# Patient Record
Sex: FEMALE | Race: WHITE | NOT HISPANIC OR LATINO | Employment: FULL TIME | URBAN - METROPOLITAN AREA
[De-identification: names, ages, dates, MRNs, and addresses within clinical notes are randomized per-mention and may not be internally consistent; named-entity substitution may affect disease eponyms.]

---

## 2017-03-24 ENCOUNTER — OFFICE VISIT - RIVER FALLS (OUTPATIENT)
Dept: FAMILY MEDICINE | Facility: CLINIC | Age: 47
End: 2017-03-24

## 2017-03-24 ASSESSMENT — MIFFLIN-ST. JEOR: SCORE: 1105.77

## 2017-10-24 ENCOUNTER — OFFICE VISIT - RIVER FALLS (OUTPATIENT)
Dept: FAMILY MEDICINE | Facility: CLINIC | Age: 47
End: 2017-10-24

## 2017-10-24 ASSESSMENT — MIFFLIN-ST. JEOR: SCORE: 1101.23

## 2017-10-25 LAB
CHOLEST SERPL-MCNC: 202 MG/DL
CHOLEST/HDLC SERPL: 2.9 {RATIO}
CREAT SERPL-MCNC: 0.76 MG/DL (ref 0.5–1.1)
GLUCOSE BLD-MCNC: 80 MG/DL (ref 65–99)
HDLC SERPL-MCNC: 69 MG/DL
LDLC SERPL CALC-MCNC: 111 MG/DL
NONHDLC SERPL-MCNC: 133 MG/DL
TRIGL SERPL-MCNC: 114 MG/DL

## 2017-11-22 ENCOUNTER — OFFICE VISIT - RIVER FALLS (OUTPATIENT)
Dept: FAMILY MEDICINE | Facility: CLINIC | Age: 47
End: 2017-11-22

## 2017-11-22 ENCOUNTER — COMMUNICATION - RIVER FALLS (OUTPATIENT)
Dept: FAMILY MEDICINE | Facility: CLINIC | Age: 47
End: 2017-11-22

## 2017-11-22 ASSESSMENT — MIFFLIN-ST. JEOR: SCORE: 1098.51

## 2017-11-29 ENCOUNTER — TRANSFERRED RECORDS (OUTPATIENT)
Dept: HEALTH INFORMATION MANAGEMENT | Facility: CLINIC | Age: 47
End: 2017-11-29

## 2017-11-29 LAB — HPV ABSTRACT: NORMAL

## 2018-09-12 ENCOUNTER — OFFICE VISIT - RIVER FALLS (OUTPATIENT)
Dept: FAMILY MEDICINE | Facility: CLINIC | Age: 48
End: 2018-09-12

## 2018-11-07 ENCOUNTER — OFFICE VISIT - RIVER FALLS (OUTPATIENT)
Dept: FAMILY MEDICINE | Facility: CLINIC | Age: 48
End: 2018-11-07

## 2018-12-07 ENCOUNTER — OFFICE VISIT - RIVER FALLS (OUTPATIENT)
Dept: FAMILY MEDICINE | Facility: CLINIC | Age: 48
End: 2018-12-07

## 2018-12-08 LAB
CHOLEST SERPL-MCNC: 172 MG/DL
CHOLEST/HDLC SERPL: 2.4 {RATIO}
CREAT SERPL-MCNC: 0.76 MG/DL (ref 0.5–1.1)
GLUCOSE BLD-MCNC: 84 MG/DL (ref 65–99)
HDLC SERPL-MCNC: 71 MG/DL
LDLC SERPL CALC-MCNC: 84 MG/DL
NONHDLC SERPL-MCNC: 101 MG/DL
TRIGL SERPL-MCNC: 84 MG/DL

## 2019-06-26 ENCOUNTER — OFFICE VISIT - RIVER FALLS (OUTPATIENT)
Dept: FAMILY MEDICINE | Facility: CLINIC | Age: 49
End: 2019-06-26

## 2019-08-19 ENCOUNTER — OFFICE VISIT - RIVER FALLS (OUTPATIENT)
Dept: FAMILY MEDICINE | Facility: CLINIC | Age: 49
End: 2019-08-19

## 2019-08-19 ASSESSMENT — MIFFLIN-ST. JEOR: SCORE: 1132.98

## 2019-09-03 ENCOUNTER — OFFICE VISIT - RIVER FALLS (OUTPATIENT)
Dept: FAMILY MEDICINE | Facility: CLINIC | Age: 49
End: 2019-09-03

## 2019-09-03 ASSESSMENT — MIFFLIN-ST. JEOR: SCORE: 1121.19

## 2019-09-24 ENCOUNTER — OFFICE VISIT - RIVER FALLS (OUTPATIENT)
Dept: FAMILY MEDICINE | Facility: CLINIC | Age: 49
End: 2019-09-24

## 2019-09-24 ASSESSMENT — MIFFLIN-ST. JEOR: SCORE: 1119.38

## 2020-01-09 ENCOUNTER — AMBULATORY - RIVER FALLS (OUTPATIENT)
Dept: FAMILY MEDICINE | Facility: CLINIC | Age: 50
End: 2020-01-09

## 2020-01-10 ENCOUNTER — COMMUNICATION - RIVER FALLS (OUTPATIENT)
Dept: FAMILY MEDICINE | Facility: CLINIC | Age: 50
End: 2020-01-10

## 2020-01-10 LAB
BUN SERPL-MCNC: 15 MG/DL (ref 7–25)
BUN/CREAT RATIO - HISTORICAL: NORMAL (ref 6–22)
CALCIUM SERPL-MCNC: 9.2 MG/DL (ref 8.6–10.2)
CHLORIDE BLD-SCNC: 106 MMOL/L (ref 98–110)
CHOLEST SERPL-MCNC: 201 MG/DL
CHOLEST/HDLC SERPL: 3.1 {RATIO}
CO2 SERPL-SCNC: 27 MMOL/L (ref 20–32)
CREAT SERPL-MCNC: 0.74 MG/DL (ref 0.5–1.1)
EGFRCR SERPLBLD CKD-EPI 2021: 95 ML/MIN/1.73M2
GLUCOSE BLD-MCNC: 94 MG/DL (ref 65–99)
HDLC SERPL-MCNC: 64 MG/DL
LDLC SERPL CALC-MCNC: 122 MG/DL
NONHDLC SERPL-MCNC: 137 MG/DL
POTASSIUM BLD-SCNC: 4.4 MMOL/L (ref 3.5–5.3)
SODIUM SERPL-SCNC: 139 MMOL/L (ref 135–146)
TRIGL SERPL-MCNC: 60 MG/DL

## 2020-01-14 ENCOUNTER — OFFICE VISIT - RIVER FALLS (OUTPATIENT)
Dept: FAMILY MEDICINE | Facility: CLINIC | Age: 50
End: 2020-01-14

## 2020-02-12 ENCOUNTER — OFFICE VISIT - RIVER FALLS (OUTPATIENT)
Dept: FAMILY MEDICINE | Facility: CLINIC | Age: 50
End: 2020-02-12

## 2020-02-12 ASSESSMENT — MIFFLIN-ST. JEOR: SCORE: 1126.63

## 2020-07-30 ENCOUNTER — OFFICE VISIT - RIVER FALLS (OUTPATIENT)
Dept: FAMILY MEDICINE | Facility: CLINIC | Age: 50
End: 2020-07-30

## 2020-07-30 ASSESSMENT — MIFFLIN-ST. JEOR: SCORE: 1108.49

## 2020-11-27 ENCOUNTER — OFFICE VISIT - RIVER FALLS (OUTPATIENT)
Dept: FAMILY MEDICINE | Facility: CLINIC | Age: 50
End: 2020-11-27

## 2020-11-27 ASSESSMENT — MIFFLIN-ST. JEOR: SCORE: 1110.3

## 2021-02-08 ENCOUNTER — AMBULATORY - RIVER FALLS (OUTPATIENT)
Dept: FAMILY MEDICINE | Facility: CLINIC | Age: 51
End: 2021-02-08

## 2021-02-08 ENCOUNTER — OFFICE VISIT - RIVER FALLS (OUTPATIENT)
Dept: FAMILY MEDICINE | Facility: CLINIC | Age: 51
End: 2021-02-08

## 2021-02-10 LAB — SARS-COV-2 RNA RESP QL NAA+PROBE: NEGATIVE

## 2021-02-16 ENCOUNTER — OFFICE VISIT - RIVER FALLS (OUTPATIENT)
Dept: FAMILY MEDICINE | Facility: CLINIC | Age: 51
End: 2021-02-16

## 2021-02-16 ASSESSMENT — MIFFLIN-ST. JEOR: SCORE: 1114.84

## 2021-09-21 ENCOUNTER — OFFICE VISIT - RIVER FALLS (OUTPATIENT)
Dept: FAMILY MEDICINE | Facility: CLINIC | Age: 51
End: 2021-09-21

## 2021-12-23 ENCOUNTER — OFFICE VISIT - RIVER FALLS (OUTPATIENT)
Dept: FAMILY MEDICINE | Facility: CLINIC | Age: 51
End: 2021-12-23

## 2022-02-04 ENCOUNTER — TRANSFERRED RECORDS (OUTPATIENT)
Dept: HEALTH INFORMATION MANAGEMENT | Facility: CLINIC | Age: 52
End: 2022-02-04

## 2022-02-11 VITALS
WEIGHT: 120.4 LBS | BODY MASS INDEX: 22.84 KG/M2 | WEIGHT: 121 LBS | HEART RATE: 72 BPM | HEIGHT: 61 IN | SYSTOLIC BLOOD PRESSURE: 136 MMHG | RESPIRATION RATE: 16 BRPM | TEMPERATURE: 99 F | TEMPERATURE: 99 F | DIASTOLIC BLOOD PRESSURE: 84 MMHG | SYSTOLIC BLOOD PRESSURE: 127 MMHG | DIASTOLIC BLOOD PRESSURE: 85 MMHG | HEIGHT: 61 IN | HEART RATE: 99 BPM | BODY MASS INDEX: 22.73 KG/M2

## 2022-02-11 VITALS
WEIGHT: 125 LBS | OXYGEN SATURATION: 98 % | HEIGHT: 61 IN | HEART RATE: 88 BPM | WEIGHT: 125.4 LBS | HEART RATE: 84 BPM | DIASTOLIC BLOOD PRESSURE: 60 MMHG | BODY MASS INDEX: 23.68 KG/M2 | HEIGHT: 61 IN | BODY MASS INDEX: 23.6 KG/M2 | TEMPERATURE: 98.7 F | SYSTOLIC BLOOD PRESSURE: 110 MMHG | SYSTOLIC BLOOD PRESSURE: 116 MMHG | DIASTOLIC BLOOD PRESSURE: 72 MMHG

## 2022-02-11 VITALS
HEART RATE: 80 BPM | SYSTOLIC BLOOD PRESSURE: 110 MMHG | WEIGHT: 120.6 LBS | DIASTOLIC BLOOD PRESSURE: 76 MMHG | TEMPERATURE: 98.5 F | BODY MASS INDEX: 22.79 KG/M2

## 2022-02-11 VITALS
SYSTOLIC BLOOD PRESSURE: 121 MMHG | DIASTOLIC BLOOD PRESSURE: 85 MMHG | TEMPERATURE: 98 F | BODY MASS INDEX: 22.86 KG/M2 | HEART RATE: 102 BPM | WEIGHT: 121 LBS

## 2022-02-11 VITALS
BODY MASS INDEX: 23.41 KG/M2 | HEIGHT: 61 IN | SYSTOLIC BLOOD PRESSURE: 108 MMHG | DIASTOLIC BLOOD PRESSURE: 70 MMHG | HEART RATE: 76 BPM | WEIGHT: 124 LBS | TEMPERATURE: 98 F

## 2022-02-11 VITALS
HEART RATE: 76 BPM | DIASTOLIC BLOOD PRESSURE: 60 MMHG | SYSTOLIC BLOOD PRESSURE: 94 MMHG | HEIGHT: 61 IN | OXYGEN SATURATION: 100 % | TEMPERATURE: 98.7 F | BODY MASS INDEX: 23.9 KG/M2 | WEIGHT: 126.6 LBS

## 2022-02-11 VITALS
HEIGHT: 61 IN | RESPIRATION RATE: 16 BRPM | TEMPERATURE: 98 F | WEIGHT: 122 LBS | BODY MASS INDEX: 23.03 KG/M2 | HEART RATE: 72 BPM | DIASTOLIC BLOOD PRESSURE: 68 MMHG | SYSTOLIC BLOOD PRESSURE: 110 MMHG

## 2022-02-11 VITALS
DIASTOLIC BLOOD PRESSURE: 78 MMHG | SYSTOLIC BLOOD PRESSURE: 120 MMHG | WEIGHT: 124 LBS | BODY MASS INDEX: 24.17 KG/M2 | DIASTOLIC BLOOD PRESSURE: 74 MMHG | WEIGHT: 128 LBS | HEART RATE: 96 BPM | SYSTOLIC BLOOD PRESSURE: 120 MMHG | TEMPERATURE: 98.2 F | HEART RATE: 101 BPM | BODY MASS INDEX: 23.43 KG/M2 | TEMPERATURE: 98.5 F | HEIGHT: 61 IN

## 2022-02-11 VITALS
TEMPERATURE: 98.5 F | WEIGHT: 123 LBS | HEART RATE: 87 BPM | HEIGHT: 61 IN | BODY MASS INDEX: 23.22 KG/M2 | DIASTOLIC BLOOD PRESSURE: 82 MMHG | HEIGHT: 61 IN | OXYGEN SATURATION: 98 % | RESPIRATION RATE: 18 BRPM | SYSTOLIC BLOOD PRESSURE: 126 MMHG | BODY MASS INDEX: 23.24 KG/M2

## 2022-02-11 VITALS
HEART RATE: 99 BPM | BODY MASS INDEX: 24.94 KG/M2 | WEIGHT: 132 LBS | DIASTOLIC BLOOD PRESSURE: 85 MMHG | SYSTOLIC BLOOD PRESSURE: 122 MMHG

## 2022-02-11 VITALS
DIASTOLIC BLOOD PRESSURE: 68 MMHG | BODY MASS INDEX: 23.15 KG/M2 | WEIGHT: 122.6 LBS | SYSTOLIC BLOOD PRESSURE: 110 MMHG | TEMPERATURE: 97.9 F | OXYGEN SATURATION: 99 % | HEART RATE: 82 BPM | HEIGHT: 61 IN

## 2022-02-11 VITALS
SYSTOLIC BLOOD PRESSURE: 114 MMHG | DIASTOLIC BLOOD PRESSURE: 78 MMHG | WEIGHT: 131 LBS | TEMPERATURE: 98.7 F | HEART RATE: 84 BPM | BODY MASS INDEX: 24.75 KG/M2

## 2022-02-16 NOTE — TELEPHONE ENCOUNTER
---------------------  From: Angelita Calle CMA (Phone Messages Pool ([id number]_[facility]))   To: [facility] Message Pool ([address]_[place] [facility] [place]);     Sent: 1/8/2019 8:44:12 AM CST  Subject: Phone Note: refill     Phone Message    PCP:   TFS      Time of Call:  8:32 am       Person Calling:  pt  Phone number:  [phone]    Returned call at: 8:42 am - LM notifying pt that TFS out of clinic until tomorrow.    Note:   Pt calls requesting a refill of diflucan (last filled 11/22/17 #3, 6 refills). Please advise.     RTC: June    Pharmacy: [facility]    Last office visit and reason:  12/7/18; well adult exam---------------------  From: Cesia Hardin ([facility] Message Pool ([id number]))   To: Jj Grey MD;     Sent: 1/8/2019 3:52:11 PM CST  Subject: FW: Phone Note: refill---------------------  From: Jj Grey MD   To: [facility] Message Pool ([facility]_[place] [facility] [place]);     Sent: 1/8/2019 5:23:04 PM CST  Subject: RE: Phone Note: refill     ok to refill same # and directionsRx sent.

## 2022-02-16 NOTE — NURSING NOTE
Comprehensive Intake Entered On:  9/24/2019 3:27 PM CDT    Performed On:  9/24/2019 3:24 PM CDT by Geni Kaur CMA               Summary   Chief Complaint :   Work comp f/u on right elbow pain   Weight Measured :   125 lb(Converted to: 125 lb 0 oz, 56.70 kg)    Height Measured :   61 in(Converted to: 5 ft 1 in, 154.94 cm)    Body Mass Index :   23.62 kg/m2   Body Surface Area :   1.56 m2   Height/Length Estimated :   61    Systolic Blood Pressure :   110 mmHg   Diastolic Blood Pressure :   72 mmHg   Mean Arterial Pressure :   85 mmHg   Peripheral Pulse Rate :   84 bpm   BP Site :   Left arm   BP Method :   Manual   Oxygen Saturation :   98 %   Geni Kaur CMA - 9/24/2019 3:24 PM CDT   Health Status   Allergies Verified? :   Yes   Medication History Verified? :   Yes   Medical History Verified? :   Yes   Pre-Visit Planning Status :   Completed   Tobacco Use? :   Current every day smoker   Geni Kaur CMA - 9/24/2019 3:24 PM CDT   Consents   Consent for Immunization Exchange :   Consent Granted   Consent for Immunizations to Providers :   Consent Granted   Geni Kaur CMA - 9/24/2019 3:24 PM CDT   Meds / Allergies   (As Of: 9/24/2019 3:27:50 PM CDT)   Allergies (Active)   lisinopril  Estimated Onset Date:   Unspecified ; Reactions:   Cough ; Created By:   Cesia Hardin; Reaction Status:   Active ; Category:   Drug ; Substance:   lisinopril ; Type:   Allergy ; Updated By:   Cesia Hardin; Reviewed Date:   9/24/2019 3:25 PM CDT        Medication List   (As Of: 9/24/2019 3:27:50 PM CDT)   Prescription/Discharge Order    hydrochlorothiazide-losartan  :   hydrochlorothiazide-losartan ; Status:   Prescribed ; Ordered As Mnemonic:   hydrochlorothiazide-losartan 12.5 mg-50 mg oral tablet ; Simple Display Line:   1 tab(s), PO, Daily, 90 tab(s), 1 Refill(s) ; Ordering Provider:   Jj Grey MD; Catalog Code:   hydrochlorothiazide-losartan ; Order Dt/Tm:   6/26/2019 4:33:49 PM            Home Meds     triamcinolone  :   triamcinolone ; Status:   Documented ; Ordered As Mnemonic:   kenalog injection ; Simple Display Line:   Once monthly into scalp ; Catalog Code:   triamcinolone ; Order Dt/Tm:   1/26/2011 4:27:33 PM

## 2022-02-16 NOTE — TELEPHONE ENCOUNTER
---------------------  From: Rupa INIGUEZ, Linda WOOD   To: Appointment Pool ([id number]_[place]);     Sent: 2/8/2021 11:35:06 AM CST  Subject: covid test     please call pt to schedule covid 19 test.    She needs to be back home by 3:00 for a meeting so feel free to double book her early if needed.  Thankspatient is scheduled for 02/08

## 2022-02-16 NOTE — LETTER
(Inserted Image. Unable to display)   December 27, 2019        BUFFY PONCENS   [address] [place], [place] [id number]        Dear BUFFY,      Thank you for selecting [facility] for your healthcare needs.    Our records indicate you are due for the following services:    Hypertension check ~ please remember to bring your at-home blood pressure readings with you to your appointment.   Fasting Lab Tests ~ Please do not eat or drink anything 10 hours prior to your scheduled appointment time.  (Water and any medications that you may need are allowed unless directed otherwise.)    If you had your labs done at another facility or with Direct Access Lab Testing at [facility], please bring in a copy of the results to your next visit, mail a copy, or drop off a copy of your results to your Healthcare Provider.    You are due for lab work and an office visit, please schedule the lab appointment 1 week before the office visit.  This will assure all results are available to discuss with your provider during your visit.    **It is very helpful if you bring your medication bottles to your appointment.  This assures we have all of your current medications, including strength and dosing information, documented accurately in your medical record.    To schedule an appointment or if you have further questions, please contact your primary clinic:   [facility]       [phone]   [facility]       [phone]              [facility]     [phone]      Powered by [employer]    Sincerely,    Jj Grey M.D.

## 2022-02-16 NOTE — PROGRESS NOTES
Patient:   BUFFY JOHNSON            MRN: [id number]            FIN: [id number]               Age:   49 years     Sex:  Female     :  1970   Associated Diagnoses:   HTN (hypertension)   Author:   Jj Grey MD      Visit Information      Date of Service: 2020 09:20 am  Performing Location: [facility]  Encounter#: [id number]      Primary Care Provider (PCP):  Jj Grey MD    NPI# [id number]      Referring Provider:  Jj Grey MD# [id number]      Chief Complaint   2020 9:23 AM CDT    f/u on BP        History of Present Illness             The patient presents for follow-up evaluation of hypertension, chief complaint and symptoms as noted above confirmed with patient .  The context of the hypertension: blood pressure was maintained within the target range.  Exacerbating factors consist of none.  Relieving factors consist of medication.  Associated symptoms consist of none.  Additional pertinent history: none.        Review of Systems   Constitutional:  Negative.    Eye:  Negative.    Ear/Nose/Mouth/Throat:  Negative.    Respiratory:  Negative.    Cardiovascular:  Negative.    Gastrointestinal:  Negative.    Genitourinary:  Negative.    Hematology/Lymphatics:  Negative.    Endocrine:  Negative.    Immunologic:  Negative.    Musculoskeletal:  Negative.    Integumentary:  Negative.    Neurologic:  Negative.       Health Status   Allergies:    Allergic Reactions (Selected)  Severity Not Documented  Lisinopril (Cough)   Medications:  (Selected)   Prescriptions  Prescribed  fluconazole 150 mg oral tablet: See Instructions, Instructions: TAKE 1 TABLET BY MOUTH 1 TIME, # 3 tab(s), 0 Refill(s), Type: Soft Stop, Pharmacy: [id number], TAKE 1 TABLET BY MOUTH 1 TIME, 61, in, 20 15:24:00 CST, Height Measured, 126.6, lb, 20 15:24:00...  hydrochlorothiazide-losartan 12.5 mg-50 mg oral tablet: 1 tab(s), PO, Daily, # 90 tab(s), 1 Refill(s),  Type: Maintenance, Pharmacy: [facility] #[id number], 1 tab(s) Oral daily  Documented Medications  Documented  kenalog injection: Instructions: Once monthly into scalp, 0 Refill(s), Type: Maintenance,    Medications          *denotes recorded medication          fluconazole 150 mg oral tablet: See Instructions, TAKE 1 TABLET BY MOUTH 1 TIME, 3 tab(s), 0 Refill(s).          hydrochlorothiazide-losartan 12.5 mg-50 mg oral tablet: 1 tab(s), PO, Daily, 90 tab(s), 1 Refill(s).          *kenalog injection: Once monthly into scalp.       Problem list:    All Problems (Selected)  Alopecia areata / SNOMED CT 384788970 / Confirmed  HTN (hypertension) / SNOMED CT 0466510665 / Confirmed  Seasonal allergies / SNOMED CT 672972668 / Confirmed  Tobacco user / SNOMED CT 902024582 / Probable      Histories   Past Medical History:    Active  Seasonal allergies (149081977)  Alopecia areata (643066089)  Resolved  Pregnancy (231624993):  Resolved in 1998 at 27 years.   Family History:    Patient was adopted.   Asthma  Son (Jameson)     Procedure history:    Tonsillectomy (SNOMED CT 723141620) performed by Ramone Colon MD on 4/8/2011 at 40 Years.  laparoscopy for endometreosis on 1/20/2008 at 37 Years.  kenalog injections for hair loss.   Social History:        Alcohol Assessment            Past, 1-2 times per month      Tobacco Assessment            Smoker, current status unknown, Cigarettes, 5 per day.  30 year(s).      Substance Abuse Assessment            Never      Employment and Education Assessment            Employed, Work/School description: .      Home and Environment Assessment            Marital status: .  Spouse/Partner name: Jovan.  Risks in environment: Unlocked guns.      Nutrition and Health Assessment            Type of diet: Regular.      Exercise and Physical Activity Assessment            Exercise frequency: 1-2 times/week.  Exercise type: jogging.      Sexual Assessment            Sexually active:  Yes.  Sexual orientation: Straight or heterosexual.  Contraceptive Use Details:  had               vasectomy.        Physical Examination   Vital Signs   7/30/2020 9:23 AM CDT Temperature Tympanic 97.9 DegF    Peripheral Pulse Rate 82 bpm    Systolic Blood Pressure 110 mmHg    Diastolic Blood Pressure 68 mmHg    Mean Arterial Pressure 82 mmHg    BP Site Right arm    BP Method Manual    Oxygen Saturation 99 %      Measurements from flowsheet : Measurements   7/30/2020 9:23 AM CDT Height Measured - Standard 61 in    Height/Length Estimated 61    Weight Measured - Standard 122.6 lb    BSA 1.55 m2    Body Mass Index 23.16 kg/m2      Eye:  Pupils are equal, round and reactive to light, Extraocular movements are intact.    Neck:  Supple, Non-tender.    Respiratory:  Lungs are clear to auscultation, Respirations are non-labored.    Cardiovascular:  Normal rate, Regular rhythm, No edema.    Gastrointestinal:  Soft, Non-tender, No organomegaly.    Integumentary:  No rash.    Neurologic:  Alert, Oriented, Cranial Nerves II-XII are grossly intact.       Health Maintenance      Recommendations     Pending (in the next year)        OverDue           Cervical Cancer Screen (if sexually active) due  06/08/15  and every 3  year(s)           Alcohol Misuse Screen (Female) due  12/07/19  and every 1  year(s)           Depression Screen (Female) due  12/07/19  and every 1  year(s)           Breast Cancer Screen due  12/19/19  and every 1  year(s)        Near Due            Influenza Vaccine near due  08/31/20  and every 1  year(s)        Due In Future            Lipid Disorders Screen (Female) not due until  01/09/21  and every 1  year(s)     Satisfied (in the past 1 year)        Satisfied            Body Mass Index Check (Female) on  07/30/20.           Body Mass Index Check (Female) on  02/12/20.           Body Mass Index Check (Female) on  01/14/20.           Body Mass Index Check (Female) on  09/24/19.           Body Mass  Index Check (Female) on  09/03/19.           Body Mass Index Check (Female) on  08/19/19.           High Blood Pressure Screen (Female) on  07/30/20.           High Blood Pressure Screen (Female) on  02/12/20.           High Blood Pressure Screen (Female) on  01/14/20.           High Blood Pressure Screen (Female) on  09/24/19.           High Blood Pressure Screen (Female) on  09/03/19.           High Blood Pressure Screen (Female) on  08/19/19.           Lipid Disorders Screen (Female) on  01/09/20.           Lipid Disorders Screen (Female) on  01/09/20.           Lipid Disorders Screen (Female) on  01/09/20.           Lipid Disorders Screen (Female) on  01/09/20.          Review / Management   Results review      Impression and Plan   Diagnosis     HTN (hypertension) (DLT59-GI I10).     Course:  Progressing as expected.    Plan:  Monitor blood pressure, fu 6 mo, diflucan for occasional yeast inf, cardiac calc score and cologuard in 6 mo.    Patient Instructions:       Counseled: Patient, Regarding diagnosis, Regarding treatment, Regarding medications, Activity, Smoking cessation.

## 2022-02-16 NOTE — CARE COORDINATION
Pt appears on  TFS chronic disease panel as out of parameters for smoking.  Smoking cessation will need to continue to be discussed for patient.

## 2022-02-16 NOTE — LETTER
(Inserted Image. Unable to display)           November 15, 2021          BUFF[place]   [address] [address]         To whom it may concern,              This letter is to inform you that we have received a copy of your mammogram results.  Your results were normal unless noted below.  You will also receive notice of your results from the radiologist within 7-10 days.  This letter is to let you know I have also received a copy and it is filed in your clinic chart.      Please plan on having your next mammogram done in 12_ months.          Please contact me or my assistant at [phone] if you have any questions or concerns.     Sincerely,        Jj Grey MD

## 2022-02-16 NOTE — LETTER
(Inserted Image. Unable to display)   August 17, 2021    BU[place] [address]   [address] [address]            Dear BUFFY,      Thank you for selecting [facility] for your healthcare needs.    Our records indicate you are due for the following services:     Hypertension check ~ please remember to bring your at-home blood pressure readings with you to your appointment.     (FYI   Regarding office visits: In some instances, a video visit or telephone visit may be offered as an option.)      To schedule an appointment or if you have further questions, please contact your clinic at [phone].      Powered by [employer]    Sincerely,    Jj Grey MD

## 2022-02-16 NOTE — LETTER
(Inserted Image. Unable to display)   January 10, 2020      BUFFY PONCENS       [address] [place], [place] [id number]        Dear BUFFY,    Thank you for selecting [facility] for your healthcare needs.  Below you will find the results of the recent tests done at our clinic.     All labs acceptable.      Result Name Current Result Previous Result Reference Range   Potassium Level (mmol/L)  4.4 1/9/2020  4.4 12/7/2018 3.5 - 5.3   Glucose Level (mg/dL)  94 1/9/2020  84 12/7/2018 65 - 99   Creatinine Level (mg/dL)  0.74 1/9/2020  0.76 12/7/2018 0.50 - 1.10   Cholesterol (mg/dL) ((H)) 201 1/9/2020  172 12/7/2018  - <200   HDL (mg/dL)  64 1/9/2020  71 12/7/2018 >50 -    LDL ((H)) 122 1/9/2020  84 12/7/2018    Triglyceride (mg/dL)  60 1/9/2020  84 12/7/2018  - <150       Please contact me or my assistant at [phone] if you have any questions.     Sincerely,        Jj Grey MD        What do your labs mean?  Below is a glossary of commonly ordered labs:  LDL   Bad Cholesterol   HDL   Good Cholesterol  AST/ALT   Liver Function   Cr/Creatinine   Kidney Function  Microalbumin   Kidney Function  BUN   Kidney Function  PSA   Prostate    TSH   Thyroid Hormone  HgbA1c   Diabetes Test   Hgb (Hemoglobin)   Red Blood Cells

## 2022-02-16 NOTE — NURSING NOTE
Comprehensive Intake Entered On:  2/16/2021 3:43 PM CST    Performed On:  2/16/2021 3:42 PM CST by Cesia Hardin               Summary   Chief Complaint :   Chronic disease visit   Weight Measured :   124.0 lb(Converted to: 124 lb 0 oz, 56.245 kg)    Height Measured :   61 in(Converted to: 5 ft 1 in, 154.94 cm)    Body Mass Index :   23.43 kg/m2   Body Surface Area :   1.55 m2   Height/Length Estimated :   61    Systolic Blood Pressure :   108 mmHg   Diastolic Blood Pressure :   70 mmHg   Mean Arterial Pressure :   83 mmHg   Peripheral Pulse Rate :   76 bpm   BP Method :   Electronic   HR Method :   Electronic   Temperature Tympanic :   98.0 DegF(Converted to: 36.7 DegC)    Cesia Hardin - 2/16/2021 3:42 PM CST   Health Status   Allergies Verified? :   Yes   Medication History Verified? :   Yes   Cesia Hardin - 2/16/2021 3:42 PM CST   ID Risk Screen   Recent Travel History :   No recent travel   Family Member Travel History :   No recent travel   Other Exposure to Infectious Disease :   Unknown   COVID-19 Testing Status :   No positive COVID-19 test   Cesia Hardin - 2/16/2021 3:42 PM CST   Social History   Social History   (As Of: 2/16/2021 3:43:44 PM CST)   Alcohol:        Past, 1-2 times per month   (Last Updated: 11/30/2017 2:28:00 PM CST by Florecita Lemos)          Tobacco:        Smoker, current status unknown, Cigarettes, 5 per day.  30 year(s).   (Last Updated: 11/27/2020 2:00:51 PM CST by Gloria Robles LPN)          Electronic Cigarette/Vaping:        Electronic Cigarette Use: Never.   (Last Updated: 11/27/2020 2:00:57 PM CST by Gloria Robles LPN)          Substance Abuse:        Never   (Last Updated: 11/30/2017 2:28:18 PM CST by Florecita Lemos)          Employment/School:        Employed, Work/School description: .   (Last Updated: 10/9/2015 3:32:31 PM CDT by Ashley Ramey)          Home/Environment:        Marital status: .  Spouse/Partner name: Jovan.  Risks in  environment: Unlocked guns.   (Last Updated: 11/30/2017 2:27:54 PM CST by Florecita Lemos)          Nutrition/Health:        Type of diet: Regular.   (Last Updated: 11/30/2017 2:28:22 PM CST by Florecita Lemos)          Exercise:        Exercise frequency: 1-2 times/week.  Exercise type: jogging.   (Last Updated: 10/9/2015 3:41:21 PM CDT by Ashley Ramey)          Sexual:        Sexually active: Yes.  Sexual orientation: Straight or heterosexual.  Contraceptive Use Details:  had vasectomy.   (Last Updated: 11/30/2017 2:28:35 PM CST by Florecita Lemos)

## 2022-02-16 NOTE — LETTER
(Inserted Image. Unable to display)         December 10, 2019        BUFFY JOHNSON   [address] [place], [place] [id number]        Dear BUFFY,    Thank you for selecting [facility] for your healthcare needs.    Our records indicate you are due for the following services:     Annual Physical     To schedule an appointment or if you have further questions, please contact your primary clinic:   [facility]       [phone]   [facility]       [phone]              [facility]     [phone]      Powered by [employer]    Sincerely,    Jj Grey MD

## 2022-02-16 NOTE — PROGRESS NOTES
Patient:   BUFFY JOHNSON            MRN: [id number]            FIN: [id number]               Age:   51 years     Sex:  Female     :  1970   Associated Diagnoses:   HTN (hypertension)   Author:   Jj Grey MD      Visit Information      Date of Service: 2021 03:09 pm  Performing Location: [facility]  Encounter#: [id number]      Primary Care Provider (PCP):  Jj Grey MD    NPI# [id number]      Referring Provider:  Jj Grey MD# [id number]      Chief Complaint   2021 3:24 PM CDT    HTN f/u and refills.        History of Present Illness             The patient presents for follow-up evaluation of hypertension, chief complaint and symptoms as noted above confirmed with patient .  The context of the hypertension: blood pressure was maintained within the target range.  Exacerbating factors consist of none.  Relieving factors consist of medication.  Associated symptoms consist of none.  Additional pertinent history: none.        Review of Systems   Constitutional:  Negative.    Eye:  Negative.    Ear/Nose/Mouth/Throat:  Negative.    Respiratory:  Negative.    Cardiovascular:  Negative.    Gastrointestinal:  Negative.    Genitourinary:  Negative.    Hematology/Lymphatics:  Negative.    Endocrine:  Negative.    Immunologic:  Negative.    Musculoskeletal:  Negative.    Integumentary:  Negative.    Neurologic:  Negative.       Health Status   Allergies:    Allergic Reactions (Selected)  Severity Not Documented  Lisinopril (Cough)   Medications:  (Selected)   Prescriptions  Prescribed  fluconazole 150 mg oral tablet: = 1 tab(s) ( 150 mg ), Oral, once, # 1 tab(s), 6 Refill(s), Type: Soft Stop, Pharmacy: [facility] #[id number], 1 tab(s) Oral once, 61, in, 20 9:23:00 CDT, Height Measured, Weight Measured  hydrochlorothiazide-losartan 12.5 mg-50 mg oral tablet: 1 tab(s), PO, Daily, # 90 tab(s), 1 Refill(s), Type: Maintenance, Pharmacy: [facility] #[id number], 1 tab(s) Oral daily, 61, in, 02/16/21 15:42:00 CST, Height Measured, 124, lb, 02/16/21 15:42:00 CST, Weight Measured  Documented Medications  Documented  kenalog injection: Instructions: Once monthly into scalp, 0 Refill(s), Type: Maintenance,    Medications          *denotes recorded medication          fluconazole 150 mg oral tablet: 150 mg, 1 tab(s), Oral, once, 1 tab(s), 6 Refill(s).          hydrochlorothiazide-losartan 12.5 mg-50 mg oral tablet: 1 tab(s), PO, Daily, 90 tab(s), 1 Refill(s).          *kenalog injection: Once monthly into scalp.       Problem list:    All Problems (Selected)  Alopecia areata / SNOMED CT 836019842 / Confirmed  HTN (hypertension) / SNOMED CT 2004027767 / Confirmed  Tobacco user / SNOMED CT 357906144 / Probable  Seasonal allergies / SNOMED CT 531077576 / Confirmed      Histories   Past Medical History:    Active  Seasonal allergies (986633787)  Alopecia areata (784557566)  Resolved  Pregnancy (602641527):  Resolved in 1998 at 27 years.   Family History:    Patient was adopted.   Asthma  Son (Jameson)     Procedure history:    Screening for malignant neoplasm of colon (SNOMED CT 3172740216) on 3/2/2021 at 50 Years.  Comments:  3/9/2021 9:34 AM CST - Gloria Naidu - Negative  Tonsillectomy (SNOMED CT 822599036) performed by Ramone Colon MD on 4/8/2011 at 40 Years.  laparoscopy for endometreosis on 1/20/2008 at 37 Years.  kenalog injections for hair loss.   Social History:        Electronic Cigarette/Vaping Assessment            Electronic Cigarette Use: Never.      Alcohol Assessment            Past, 1-2 times per month      Tobacco Assessment            Smoker, current status unknown, Cigarettes, 5 per day.  30 year(s).      Substance Abuse Assessment            Never      Employment and Education Assessment            Employed, Work/School description: .      Home and Environment Assessment            Marital status: .  Spouse/Partner  name: Jovan.  Risks in environment: Unlocked guns.      Nutrition and Health Assessment            Type of diet: Regular.      Exercise and Physical Activity Assessment            Exercise frequency: 1-2 times/week.  Exercise type: jogging.      Sexual Assessment            Sexually active: Yes.  Sexual orientation: Straight or heterosexual.  Contraceptive Use Details:  had               vasectomy.        Physical Examination   Vital Signs   9/21/2021 3:24 PM CDT Temperature Tympanic 98.7 DegF    Peripheral Pulse Rate 84 bpm    Pulse Site Radial artery    HR Method Electronic    Systolic Blood Pressure 114 mmHg    Diastolic Blood Pressure 78 mmHg    Mean Arterial Pressure 90 mmHg    BP Site Right arm    BP Method Electronic      Measurements from flowsheet : Measurements   9/21/2021 3:24 PM CDT    Weight Measured - Standard                131 lb     Eye:  Pupils are equal, round and reactive to light, Extraocular movements are intact.    Neck:  Supple, Non-tender.    Respiratory:  Lungs are clear to auscultation, Respirations are non-labored.    Cardiovascular:  Normal rate, Regular rhythm, No edema.    Gastrointestinal:  Soft, Non-tender, No organomegaly.    Integumentary:  No rash.    Neurologic:  Alert, Oriented, Cranial Nerves II-XII are grossly intact.       Health Maintenance      Recommendations     Pending (in the next year)        OverDue           Breast Cancer Screen due  12/19/19  and every 1  year(s)           Lipid Disorders Screen due  01/09/21  and every 1  year(s)           Alcohol Misuse Screen due  07/30/21  and every 1  year(s)           Depression Screen due  07/30/21  and every 1  year(s)        Due            Influenza Vaccine due  09/01/21  and every 1  year(s)           Aspirin Therapy for Prevention of CVD and CRC due  09/21/21  and every 5  year(s)        Due In Future            Body Mass Index Check not due until  02/16/22  and every 1  year(s)     Satisfied (in the past 1 year)         Satisfied            Body Mass Index Check on  02/16/21.           Body Mass Index Check on  11/27/20.           COVID-19 Vaccine (Moderna) Dose 2 on  03/26/21.           COVID-19 Vaccine (Moderna) Dose 1 on  02/26/21.           Colorectal Cancer Screen (Colonoscopy) on  03/02/21.           Colorectal Cancer Screen (Occult Blood) on  03/02/21.           Colorectal Cancer Screen (Sigmoidoscopy) on  03/02/21.           High Blood Pressure Screen on  09/21/21.           High Blood Pressure Screen on  02/16/21.           High Blood Pressure Screen on  11/27/20.          Review / Management   Results review      Impression and Plan   Diagnosis     HTN (hypertension) (CJB04-HX I10).     Course:  Progressing as expected.    Plan:  Monitor blood pressure, fu 6 mo, diflucan for occasional yeast inf,    .    Patient Instructions:       Counseled: Patient, Regarding diagnosis, Regarding treatment, Regarding medications, Activity, Smoking cessation.

## 2022-02-16 NOTE — NURSING NOTE
Comprehensive Intake Entered On:  9/3/2019 3:09 PM CDT    Performed On:  9/3/2019 3:03 PM CDT by Caro Hoffman CMA               Summary   Chief Complaint :   Follow up right elbow pain    Weight Measured :   125.4 lb(Converted to: 125 lb 6 oz, 56.88 kg)    Height Measured :   61 in(Converted to: 5 ft 1 in, 154.94 cm)    Body Mass Index :   23.69 kg/m2   Body Surface Area :   1.56 m2   Systolic Blood Pressure :   116 mmHg   Diastolic Blood Pressure :   60 mmHg   Mean Arterial Pressure :   79 mmHg   Peripheral Pulse Rate :   88 bpm   BP Site :   Left arm   Pulse Site :   Radial artery   BP Method :   Manual   HR Method :   Manual   Temperature Tympanic :   98.7 DegF(Converted to: 37.1 DegC)    Caro Hoffman CMA - 9/3/2019 3:03 PM CDT   Health Status   Allergies Verified? :   Yes   Medication History Verified? :   Yes   Medical History Verified? :   No   Pre-Visit Planning Status :   Completed   Tobacco Use? :   Current every day smoker   Caro Hoffman CMA - 9/3/2019 3:03 PM CDT   Meds / Allergies   (As Of: 9/3/2019 3:09:44 PM CDT)   Allergies (Active)   lisinopril  Estimated Onset Date:   Unspecified ; Reactions:   Cough ; Created By:   Cesia Hardin; Reaction Status:   Active ; Category:   Drug ; Substance:   lisinopril ; Type:   Allergy ; Updated By:   Cesia Hardin; Reviewed Date:   9/3/2019 3:07 PM CDT        Medication List   (As Of: 9/3/2019 3:09:44 PM CDT)   Prescription/Discharge Order    hydrochlorothiazide-losartan  :   hydrochlorothiazide-losartan ; Status:   Prescribed ; Ordered As Mnemonic:   hydrochlorothiazide-losartan 12.5 mg-50 mg oral tablet ; Simple Display Line:   1 tab(s), PO, Daily, 90 tab(s), 1 Refill(s) ; Ordering Provider:   Jj Grey MD; Catalog Code:   hydrochlorothiazide-losartan ; Order Dt/Tm:   6/26/2019 4:33:49 PM          naproxen  :   naproxen ; Status:   Completed ; Ordered As Mnemonic:   Naprosyn 500 mg oral tablet ; Simple Display Line:   500 mg, 1 tab(s), Oral,  bid, for 14 day(s), 28 tab(s), 0 Refill(s) ; Ordering Provider:   Ronald Conrad MD; Catalog Code:   naproxen ; Order Dt/Tm:   8/19/2019 3:52:10 PM            Home Meds    triamcinolone  :   triamcinolone ; Status:   Documented ; Ordered As Mnemonic:   kenalog injection ; Simple Display Line:   Once monthly into scalp ; Catalog Code:   triamcinolone ; Order Dt/Tm:   1/26/2011 4:27:33 PM

## 2022-02-16 NOTE — TELEPHONE ENCOUNTER
---------------------  From: Alexandr Pina   To: Rupa INIGUEZ, Linda WOOD;     Sent: 2/9/2021 3:27:06 PM CST  Subject: Covid results     Called pt about her negative COVID results. Pt is feeling better and requested that her results be sent to her work @ [phone]

## 2022-02-16 NOTE — PROGRESS NOTES
Patient:   BUFFY JOHNSON            MRN: [id number]            FIN: [id number]               Age:   51 years     Sex:  Female     :  1970   Associated Diagnoses:   External hemorrhoid   Author:   Jj Grey MD      Visit Information      Date of Service: 2021 07:56 am  Performing Location: [facility]  Encounter#: [id number]      Primary Care Provider (PCP):  Jj Grey MD    NPI# [id number]      Referring Provider:  Jj Grey MD, NPI# [id number]      Chief Complaint   2021 8:01 AM CST   Rectal irritation - itches at night. Normal BMs. Hx of hemorrhoids.        History of Present Illness   chief complaint and symptoms as noted above confirmed with patient       Review of Systems   Constitutional:  Negative except as documented in history of present illness.    Gastrointestinal:  Negative except as documented in history of present illness.    Genitourinary:  Negative.    Integumentary:  Negative except as documented in history of present illness.    Neurologic:  Negative.       Health Status   Allergies:    Allergic Reactions (Selected)  Severity Not Documented  Lisinopril (Cough)   Medications:  (Selected)   Prescriptions  Prescribed  fluconazole 150 mg oral tablet: = 1 tab(s) ( 150 mg ), Oral, once, # 1 tab(s), 6 Refill(s), Type: Soft Stop, Pharmacy: [facility] #[id number], 1 tab(s) Oral once, 61, in, 21 15:42:00 CST, Height Measured, 131, lb, 21 15:24:00 CDT, Weight Measured  hydrochlorothiazide-losartan 12.5 mg-50 mg oral tablet: 1 tab(s), PO, Daily, # 90 tab(s), 1 Refill(s), Type: Maintenance, Pharmacy: [facility] #[id number], 1 tab(s) Oral daily, 61, in, 21 15:42:00 CST, Height Measured, 131, lb, 21 15:24:00 CDT, Weight Measured  hydrocortisone-lidocaine 0.5%-3% rectal cream: See Instructions, Instructions: VT bid, # 85 gm, 0 Refill(s), Type: Maintenance, Pharmacy: [facility] #[id number], VT bid, 61, in,  02/16/21 15:42:00 CST, Height Measured, 132, lb, 12/23/21 8:01:00 CST, Weight Measured  Documented Medications  Documented  kenalog injection: Instructions: Once monthly into scalp, 0 Refill(s), Type: Maintenance   Problem list:    All Problems (Selected)  Seasonal allergies / SNOMED CT 331585460 / Confirmed  Alopecia areata / SNOMED CT 297524681 / Confirmed  HTN (hypertension) / SNOMED CT 5504508170 / Confirmed  Tobacco user / SNOMED CT 519507520 / Probable      Histories   Past Medical History:    Active  Seasonal allergies (024305879)  Alopecia areata (616849751)  Resolved  Pregnancy (883848680):  Resolved in 1998 at 27 years.   Family History:    Patient was adopted.   Asthma  Son (Jameson)     Procedure history:    Screening for malignant neoplasm of colon (SNOMED CT 6419225556) on 3/2/2021 at 50 Years.  Comments:  3/9/2021 9:34 AM CST - Gloria Naidu - Negative  Tonsillectomy (SNOMED CT 761214798) performed by Ramone Colon MD on 4/8/2011 at 40 Years.  laparoscopy for endometreosis on 1/20/2008 at 37 Years.  kenalog injections for hair loss.   Social History:        Electronic Cigarette/Vaping Assessment            Electronic Cigarette Use: Never.            Electronic Cigarette Use: Never.      Alcohol Assessment            Past, 1-2 times per month      Tobacco Assessment            Smoker, current status unknown, Cigarettes, 5 per day.  30 year(s).            5-9 cigarettes (between 1/4 to 1/2 pack)/day in last 30 days, Cigarettes      Substance Abuse Assessment            Never      Employment and Education Assessment            Employed, Work/School description: .      Home and Environment Assessment            Marital status: .  Spouse/Partner name: Jovan.  Risks in environment: Unlocked guns.      Nutrition and Health Assessment            Type of diet: Regular.      Exercise and Physical Activity Assessment            Exercise frequency: 1-2 times/week.  Exercise type:  jogging.      Sexual Assessment            Sexually active: Yes.  Sexual orientation: Straight or heterosexual.  Contraceptive Use Details:  had               vasectomy.        Physical Examination   Measurements from flowsheet : Measurements   12/23/2021 8:01 AM CST Height/Length Estimated 61    Weight Measured - Standard 132 lb      General:  Alert and oriented, No acute distress.    Integumentary:  hemorrhoids noted otherwise rectum normal.       Impression and Plan   Diagnosis     External hemorrhoid (OBF24-XC K64.4).     Course:  Progressing as expected.    Plan:  see meds.    Patient Instructions:       Counseled: Patient, Regarding diagnosis, Regarding treatment, Regarding medications, Diet, Activity.

## 2022-02-16 NOTE — PROGRESS NOTES
Patient:   BUFFY JOHNSON            MRN: [id number]            FIN: [id number]               Age:   50 years     Sex:  Female     :  1970   Associated Diagnoses:   Chest pain   Author:   Alejandro Barksdale PA-C      Visit Information   Visit type:  General concerns.    Accompanied by:  No one.    Source of history:  Self, Medical record.    Referral source:  Self.    History limitation:  None.       Chief Complaint   2020 1:58 PM CST   last night had some chest pain, improved over the night but heavy chest feeling come back again this afternoon.      History of Present Illness             The patient presents with chest pain.  The chest pain is located on the left and anterior.  The chest pain is described as tightness and pressure.  Radiation of pain to the shoulders.  The severity of the chest pain is mild.  The chest pain is episodic.  The chest pain has lasted for 13 hour(s).  Associated symptoms consist of nausea, denies abdominal pain, denies back pain, denies dizziness, denies fatigue, denies palpitations, denies syncope and denies vomiting.  Exacerbating factors consist of none.  Chest pain during the night. Took some Aleve and went back to sleep. Woke up this AM and felt fine. Chest tight sensation or heaviness has returned late AM and persists now. No fever. No cough. No extra exertional activities past ten days. HTN about 4 years. Has been well controlled. CC above noted and confirmed with the patient. No reflux symptoms..        Review of Systems   Constitutional:  Negative.    Eye:  Negative.    Ear/Nose/Mouth/Throat:  Negative.    Respiratory:  Negative.    Cardiovascular:  Negative except as documented in history of present illness.    Gastrointestinal:  Negative.       Health Status   Allergies:    Allergic Reactions (Selected)  Severity Not Documented  Lisinopril (Cough)   Medications:  (Selected)   Prescriptions  Prescribed  fluconazole 150 mg oral tablet: = 1 tab(s) ( 150 mg ), Oral, once,  # 1 tab(s), 6 Refill(s), Type: Soft Stop, Pharmacy: [facility] #[id number], 1 tab(s) Oral once, 61, in, 07/30/20 9:23:00 CDT, Height Measured, Weight Measured  hydrochlorothiazide-losartan 12.5 mg-50 mg oral tablet: 1 tab(s), PO, Daily, # 90 tab(s), 1 Refill(s), Type: Maintenance, Pharmacy: [facility] #[id number], 1 tab(s) Oral daily, 61, in, 07/30/20 9:23:00 CDT, Height Measured, 122.6, lb, 07/30/20 9:23:00 CDT, Weight Measured  Documented Medications  Documented  kenalog injection: Instructions: Once monthly into scalp, 0 Refill(s), Type: Maintenance   Problem list:    All Problems (Selected)  Tobacco user / SNOMED CT 845608403 / Probable  Seasonal allergies / SNOMED CT 759525626 / Confirmed  HTN (hypertension) / SNOMED CT 9972317797 / Confirmed  Alopecia areata / SNOMED CT 341749348 / Confirmed      Histories   Past Medical History:    Active  Seasonal allergies (956363318)  Alopecia areata (772981748)  Resolved  Pregnancy (252131989):  Resolved in 1998 at 27 years.   Family History:    Patient was adopted.   Asthma  Son (Jameson)     Procedure history:    Tonsillectomy (031917963) on 4/8/2011 at 40 Years.  laparoscopy for endometreosis on 1/20/2008 at 37 Years.  kenalog injections for hair loss.   Social History:        Electronic Cigarette/Vaping Assessment            Electronic Cigarette Use: Never.      Alcohol Assessment            Past, 1-2 times per month      Tobacco Assessment            Smoker, current status unknown, Cigarettes, 5 per day.  30 year(s).      Substance Abuse Assessment            Never      Employment and Education Assessment            Employed, Work/School description: .      Home and Environment Assessment            Marital status: .  Spouse/Partner name: Jovan.  Risks in environment: Unlocked guns.      Nutrition and Health Assessment            Type of diet: Regular.      Exercise and Physical Activity Assessment            Exercise frequency: 1-2  times/week.  Exercise type: jogging.      Sexual Assessment            Sexually active: Yes.  Sexual orientation: Straight or heterosexual.  Contraceptive Use Details:  had               vasectomy.        Physical Examination   Vital Signs   11/27/2020 1:58 PM CST Temperature Tympanic 98.5 DegF    Peripheral Pulse Rate 87 bpm    Pulse Site Radial artery    HR Method Manual    Respiratory Rate 18 br/min    Systolic Blood Pressure 126 mmHg    Diastolic Blood Pressure 82 mmHg  HI    Mean Arterial Pressure 97 mmHg    BP Site Right arm    BP Method Manual    Oxygen Saturation 98 %      Measurements from flowsheet : Measurements   11/27/2020 1:58 PM CST Height Measured - Standard 61 in    Height/Length Estimated 61    Weight Measured - Standard 123 lb    BSA 1.55 m2    Body Mass Index 23.24 kg/m2      General:  No acute distress.    Neck:  Supple, Non-tender, No lymphadenopathy.    Respiratory:  Lungs are clear to auscultation, Respirations are non-labored, Breath sounds are equal.    Cardiovascular:  Normal rate, Regular rhythm, No murmur.    Musculoskeletal:  Mid chest wall tenderness to palpation.       Review / Management   Results review:  Lab results   11/27/2020 2:39 PM CST Sodium Level 139 mmol/L    Potassium Level 3.7 mmol/L    Chloride Level 105 mmol/L    CO2 Level 27 mmol/L    AGAP 7    Glucose Level 82 mg/dL    BUN 11 mg/dL    Creatinine Level 0.71 mg/dL    BUN/Creat Ratio 15    eGFR  >60    eGFR Non-African American >60    Calcium Level 8.9 mg/dL    Troponin I <0.010 ng/mL    WBC 8.7    RBC 4.62    Hgb 14.1 g/dL    Hct 42.1 %    MCV 91 fL    MCH 30.5 pg    MCHC 33.5 g/dL    RDW 14.1 %    Platelet 198    MPV 11.0 fL    D-Dimer 0.35   .    ECG interpretation:  Within normal limits, Normal sinus rhythm.       Impression and Plan   Diagnosis     Chest pain (RZE77-LF R07.9).     Patient Instructions:       Counseled: Patient, Regarding diagnosis, Activity, Verbalized understanding.     Summary:  Treat as chest wall pain. Unlikely to be cardiac due to duration and negative Troponin with normal EKG. Di-Dimer negative. CBC and chem 8 normal. To ED if pain returns, SOB, fever, chills, diaphoresis, nausea etc.    .

## 2022-02-16 NOTE — PROGRESS NOTES
Patient:   BUFFY JOHNSON            MRN: [id number]            FIN: [id number]               Age:   48 years     Sex:  Female     :  1970   Associated Diagnoses:   Lateral epicondylitis   Author:   Ronald Conrad MD      Visit Information      Date of Service: 2019 03:07 pm  Performing Location: [facility]  Encounter#: [id number]      Primary Care Provider (PCP):  Jj Grey MD    NPI# [id number]      Referring Provider:  Ronald Conrad MD    NPI# [id number]      Chief Complaint   2019 3:12 PM CDT    c/o jamming right elbow on wheelchair while at work on 19,having pain in elbow and radiates down into hand, having hard time gripping objects with right hand, numbness        History of Present Illness   Pulling a strap and hit her elbow on wheelchair in back of her. Injury happened a week ago. Now having pain shooting down to her hand and having a hard time grasping things. Taking ibuprofen does help the pain and ability to grasp things. Pain staying the same. Using it during the day aggravates it.       Review of Systems   Constitutional:  No fever.    Gastrointestinal:  No vomiting.    No weakness      Health Status   Allergies:    Allergic Reactions (Selected)  Severity Not Documented  Lisinopril (Cough)      Physical Examination   Vital Signs   2019 3:12 PM CDT Temperature Tympanic 98.2 DegF    Peripheral Pulse Rate 96 bpm    Pulse Site Radial artery    HR Method Manual    Systolic Blood Pressure 120 mmHg    Diastolic Blood Pressure 74 mmHg    Mean Arterial Pressure 89 mmHg    BP Site Right arm    BP Method Manual      Measurements from flowsheet : Measurements   2019 3:12 PM CDT Height Measured - Standard 61 in    Weight Measured - Standard 128 lb    BSA 1.58 m2    Body Mass Index 24.18 kg/m2      General:  Alert and oriented, No acute distress.    Musculoskeletal:  tender posterior elbow. Pain greater with wrist extension.    Integumentary:  no bruising but  mild swelling over the lateral right elbow.       Impression and Plan   Diagnosis     Lateral epicondylitis (QXR37-OC M77.10).       Elbow brace and naprosyn for two weeks and then recheck. Patient does not feel she needs restrictions currently.

## 2022-02-16 NOTE — PROGRESS NOTES
Patient:   BUFFY JOHNSON            MRN: [id number]            FIN: [id number]               Age:   48 years     Sex:  Female     :  1970   Associated Diagnoses:   Right elbow pain   Author:   Ronald Conrad MD      Visit Information      Date of Service: 2019 02:58 pm  [facility] [place] [place]  Encounter#: [id number]      Primary Care Provider (PCP):  Jj Grey MD    NPI# [id number]      Referring Provider:  Ronald Conrad MD    NPI# [id number]      Chief Complaint   9/3/2019 3:03 PM CDT     Follow up right elbow pain        History of Present Illness   Pulling a strap and hit her elbow on wheelchair in back of her. Injury happened three weeks ago. Seen two weeks ago and no improvement with the naprosyn. Still having pain shooting down to her hand and having a hard time grasping things. Pain staying the same. Using it during the day aggravates it.       Review of Systems   Constitutional:  No fever.    Gastrointestinal:  No vomiting.    No weakness      Health Status   Allergies:    Allergic Reactions (Selected)  Severity Not Documented  Lisinopril (Cough)      Histories   Social history: works at [employer]      Physical Examination   Vital Signs   9/3/2019 3:03 PM CDT Temperature Tympanic 98.7 DegF    Peripheral Pulse Rate 88 bpm    Pulse Site Radial artery    HR Method Manual    Systolic Blood Pressure 116 mmHg    Diastolic Blood Pressure 60 mmHg    Mean Arterial Pressure 79 mmHg    BP Site Left arm    BP Method Manual      Measurements from flowsheet : Measurements   9/3/2019 3:03 PM CDT Height Measured - Standard 61 in    Weight Measured - Standard 125.4 lb    BSA 1.56 m2    Body Mass Index 23.69 kg/m2      General:  Alert and oriented, No acute distress.    Musculoskeletal:  tender lateral right epicondyle. Pain greater with wrist extension.    Integumentary:  no bruising but mild swelling over the lateral right elbow.       Impression and Plan   Diagnosis     Right  elbow pain (WZG73-BJ M25.521).       Possible epicondylitis vs tendinopathy: Continue Elbow brace. Follow up in 3 weeks. Patient does not feel she needs restrictions currently. Refer to Physical Therapy

## 2022-02-16 NOTE — NURSING NOTE
Comprehensive Intake Entered On:  2019 3:16 PM CDT    Performed On:  2019 3:12 PM CDT by Caro Hoffman CMA               Summary   Chief Complaint :   c/o jamming right elbow on wheelchair while at work on 19,having pain in elbow and radiates down into hand, having hard time gripping objects with right hand, numbness   Weight Measured :   128 lb(Converted to: 128 lb 0 oz, 58.06 kg)    Height Measured :   61 in(Converted to: 5 ft 1 in, 154.94 cm)    Body Mass Index :   24.18 kg/m2   Body Surface Area :   1.58 m2   Systolic Blood Pressure :   120 mmHg   Diastolic Blood Pressure :   74 mmHg   Mean Arterial Pressure :   89 mmHg   Peripheral Pulse Rate :   96 bpm   BP Site :   Right arm   Pulse Site :   Radial artery   BP Method :   Manual   HR Method :   Manual   Temperature Tympanic :   98.2 DegF(Converted to: 36.8 DegC)    Caro Hoffman CMA - 2019 3:12 PM CDT   Health Status   Allergies Verified? :   Yes   Medication History Verified? :   Yes   Medical History Verified? :   No   Pre-Visit Planning Status :   Not completed   Tobacco Use? :   Current every day smoker   Caro Hoffman CMA - 2019 3:12 PM CDT   Demographics   Last Name :   [facility]   Address :   [address]   First Name :   Valencia   Responsible Party Date of Birth () :   1970 CDT   City :   [place]   State :   [place]   Zip Code :   [address]   Caro Hoffman CMA - 2019 3:12 PM CDT   Providers Grid   Provider Name :    Linda Donohue           Provider Specialty :    Dermatologist   Dentist           Comments :    [facility] CMA, Jessica - 2019 3:12 PM CDT  Caro Hoffman CMA - 2019 3:12 PM CDT        Meds / Allergies   (As Of: 2019 3:17:01 PM CDT)   Allergies (Active)   lisinopril  Estimated Onset Date:   Unspecified ; Reactions:   Cough ; Created By:   Cesia Morejon CMA; Reaction Status:   Active ; Category:   Drug ; Substance:    lisinopril ; Type:   Allergy ; Updated By:   Cesia Morejon CMA; Reviewed Date:   8/19/2019 3:14 PM CDT        Medication List   (As Of: 8/19/2019 3:17:01 PM CDT)   Prescription/Discharge Order    fluconazole  :   fluconazole ; Status:   Processing ; Ordered As Mnemonic:   Diflucan 150 mg oral tablet ; Ordering Provider:   Jj Grey MD; Action Display:   Complete ; Catalog Code:   fluconazole ; Order Dt/Tm:   8/19/2019 3:15:02 PM          hydrochlorothiazide-losartan  :   hydrochlorothiazide-losartan ; Status:   Prescribed ; Ordered As Mnemonic:   hydrochlorothiazide-losartan 12.5 mg-50 mg oral tablet ; Simple Display Line:   1 tab(s), PO, Daily, 90 tab(s), 1 Refill(s) ; Ordering Provider:   Jj Grey MD; Catalog Code:   hydrochlorothiazide-losartan ; Order Dt/Tm:   6/26/2019 4:33:49 PM            Home Meds    multivitamin with minerals  :   multivitamin with minerals ; Status:   Processing ; Ordered As Mnemonic:   Vitamin D with Minerals oral tablet ; Action Display:   Complete ; Catalog Code:   multivitamin with minerals ; Order Dt/Tm:   8/19/2019 3:15:04 PM          triamcinolone  :   triamcinolone ; Status:   Documented ; Ordered As Mnemonic:   kenalog injection ; Simple Display Line:   Once monthly into scalp ; Catalog Code:   triamcinolone ; Order Dt/Tm:   1/26/2011 4:27:33 PM

## 2022-02-16 NOTE — TELEPHONE ENCOUNTER
---------------------  From: Lashanda Francis LPN (Phone Messages Pool ([address] [place] [address]))   To: [facility] Message Pool ([address] [place] [address]);     Sent: 3/26/2021 3:59:07 PM CDT  Subject: BP med     Phone Message    PCP:   LUBA      Time of Call:  3:55pm       Person Calling:  pt  Phone number:  [phone]    Note:   Pt calling stating she was in about 3 weeks ago and TFS took her off losartan-hctz and switched Rx to just losartan.    Pt says she wants to go back on the losartan-hctz. Pt says she is feeling bloated/retaining water and doesn't feel good. Pt says this started occurring about 1 week after switching to just losartan.    Pt asking if Rx can be sent to [facility] for losartan-hctz.    Pt is aware TFS not back in clinic until Tuesday.    Last office visit and reason:  2/16/21 Hypertension---------------------  From: Cesia Hardin ([employer] Message Pool ([address] [place] [address]))   To: Jj Grey MD;     Sent: 3/27/2021 7:53:41 AM CDT  Subject: FW: BP med?   ?   ---------------------  From: Jj Grey  To: Cesia Hardin ([employer] Message Pool ([address] [place] [address]))  Sent: March 27, 2021 12:09 PM CDT  Subject: RE: BP med  ???  Ok to resume the losartan hctz same dose dsp 6 moRx sent.  Patient notified.   scds

## 2022-02-16 NOTE — PROGRESS NOTES
Patient:   BUFFY JOHNSON            MRN: [id number]            FIN: [id number]               Age:   48 years     Sex:  Female     :  1970   Associated Diagnoses:   Well adult exam; Tobacco user; HTN (hypertension); Alopecia areata   Author:   Jj Grey MD      Visit Information      Date of Service: 2018 07:59 am  Performing Location: [facility]  Encounter#: [id number]      Primary Care Provider (PCP):  Jj Grey MD    NPI# [id number]   Visit type:  Annual exam.    Accompanied by:  No one.    Source of history:  Self.    Referral source   History limitation:  None.       Chief Complaint   2018 8:03 AM CST    Px        Well Adult History   Well Adult History   The general health status is good.  The effect on daily activities is no change in activity level, no change in eating habits, no change in sexual activity, no change in sleeping patterns and no change in work/school duties.     Meds as directed for htn    Daughter in [facility] [employer]  Smoke 1-2 cig/day      Review of Systems   Constitutional:  No weakness, No fatigue, No decreased activity, No weight gain.    Eye:  No recent visual problem, No blurring, No double vision.    Ear/Nose/Mouth/Throat:  Negative.    Respiratory:  No shortness of breath, No cough.    Cardiovascular:  No chest pain, No peripheral edema.    Gastrointestinal:  No nausea, No vomiting, No diarrhea, No constipation, No abdominal pain.    Genitourinary:  No dysuria, No hematuria, No change in urine stream.    Hematology/Lymphatics:  Negative.    Endocrine:  Negative.    Immunologic:  Negative.    Musculoskeletal:  Negative.    Integumentary:  No rash.    Neurologic:  Alert and oriented X4.    Psychiatric:  Negative.              Health Status   Allergies:    Allergic Reactions (Selected)  Severity Not Documented  Lisinopril (Cough)   Medications:  (Selected)   Prescriptions  Prescribed  hydrochlorothiazide-losartan 12.5  mg-50 mg oral tablet: 1 tab(s), PO, Daily, # 30 tab(s), 0 Refill(s), Type: Maintenance, Pharmacy: [id number], 1 tab(s) Oral daily  Documented Medications  Documented  Vitamin D with Minerals oral tablet: 1 tab(s), po, daily, 0 Refill(s), Type: Maintenance  kenalog injection: Instructions: Once monthly into scalp, 0 Refill(s), Type: Maintenance   Problem list:    All Problems  Alopecia areata / SNOMED CT 988852753 / Confirmed  HTN (hypertension) / SNOMED CT 9072807813 / Confirmed  Seasonal allergies / SNOMED CT 871530875 / Confirmed  Tobacco user / SNOMED CT 379409583 / Probable  Resolved: Pregnancy / SNOMED CT 685476852      Histories   Past Medical History:    Active  Seasonal allergies (949502019)  Alopecia areata (398435200)  Resolved  Pregnancy (407321252):  Resolved in 1998 at 27 years.   Family History:    Patient was adopted.   Asthma  Son (Jameson)     Procedure history:    Tonsillectomy (SNOMED CT 887331999) performed by Ramone Colon MD on 4/8/2011 at 40 Years.  laparoscopy for endometreosis on 1/20/2008 at 37 Years.  kenalog injections for hair loss.   Social History:        Alcohol Assessment            Past, 1-2 times per month      Tobacco Assessment            Smoker, current status unknown, Cigarettes, 5 per day.  30 year(s).      Substance Abuse Assessment            Never      Employment and Education Assessment            Employed, Work/School description: .      Home and Environment Assessment            Marital status: .  Spouse/Partner name: Jovan.  Risks in environment: Unlocked guns.      Nutrition and Health Assessment            Type of diet: Regular.      Exercise and Physical Activity Assessment            Exercise frequency: 1-2 times/week.  Exercise type: jogging.      Sexual Assessment            Sexually active: Yes.  Sexual orientation: Straight or heterosexual.  Contraceptive Use Details:  had               vasectomy.,        Alcohol Assessment             Past, 1-2 times per month      Tobacco Assessment            Smoker, current status unknown, Cigarettes, 5 per day.  30 year(s).      Substance Abuse Assessment            Never      Employment and Education Assessment            Employed, Work/School description: .      Home and Environment Assessment            Marital status: .  Spouse/Partner name: Jovan.  Risks in environment: Unlocked guns.      Nutrition and Health Assessment            Type of diet: Regular.      Exercise and Physical Activity Assessment            Exercise frequency: 1-2 times/week.  Exercise type: jogging.      Sexual Assessment            Sexually active: Yes.  Sexual orientation: Straight or heterosexual.  Contraceptive Use Details:  had               vasectomy.      Physical Examination   Vital Signs   12/7/2018 8:03 AM CST Temperature Tympanic 98.0 DegF    Peripheral Pulse Rate 102 bpm  HI    HR Method Electronic    Systolic Blood Pressure 121 mmHg    Diastolic Blood Pressure 85 mmHg  HI    Mean Arterial Pressure 97 mmHg    BP Method Electronic      Measurements from flowsheet : Measurements   12/7/2018 8:03 AM CST    Weight Measured - Standard                121.0 lb     General:  Alert and oriented, No acute distress.    Eye:  Pupils are equal, round and reactive to light, Extraocular movements are intact.    HENT:  Tympanic membranes are clear, No pharyngeal erythema.    Neck:  Supple, Non-tender, No carotid bruit, No jugular venous distention, No lymphadenopathy, No thyromegaly.    Respiratory:  Lungs are clear to auscultation, Respirations are non-labored.    Cardiovascular:  Normal rate, Regular rhythm, No murmur.    Gastrointestinal:  Soft, Non-tender, No organomegaly.    Musculoskeletal:  No swelling, No deformity.    Integumentary:  Warm, Dry, Pink.    Neurologic:  Alert, Oriented.    Cognition and Speech:  Oriented.    Psychiatric:  Cooperative.       Impression and Plan   Diagnosis     Well  adult exam (TWN31-MA Z00.00).     Tobacco user (MMN35-JY Z72.0).     HTN (hypertension) (RNV47-OT I10).     Alopecia areata (YKL68-QR L63.9).     Plan:  renew meds 6 mo.    Patient Instructions:       Counseled: Patient, Regarding diagnosis, Regarding medications, Smoking cessation, Verbalized understanding.    Counseled:  Patient, Regarding diagnosis, Regarding treatment.    Patient Instructions

## 2022-02-16 NOTE — NURSING NOTE
Comprehensive Intake Entered On:  2/12/2020 3:26 PM CST    Performed On:  2/12/2020 3:24 PM CST by Cesia Hardin               Summary   Chief Complaint :   Cough x2 weeks.  Worse at night, increased phlegm.    Weight Measured :   126.6 lb(Converted to: 126 lb 10 oz, 57.42 kg)    Height Measured :   61 in(Converted to: 5 ft 1 in, 154.94 cm)    Body Mass Index :   23.92 kg/m2   Body Surface Area :   1.57 m2   Height/Length Estimated :   61    Systolic Blood Pressure :   94 mmHg   Diastolic Blood Pressure :   60 mmHg   Mean Arterial Pressure :   71 mmHg   Peripheral Pulse Rate :   76 bpm   Temperature Tympanic :   98.7 DegF(Converted to: 37.1 DegC)    Oxygen Saturation :   100 %   Cesia Hardin - 2/12/2020 3:24 PM CST   Health Status   Allergies Verified? :   Yes   Medication History Verified? :   Yes   Tobacco Use? :   Current every day smoker   Cesia Hardin - 2/12/2020 3:24 PM CST   Meds / Allergies   (As Of: 2/12/2020 3:26:43 PM CST)   Allergies (Active)   lisinopril  Estimated Onset Date:   Unspecified ; Reactions:   Cough ; Created By:   Cesia Morejon CMA; Reaction Status:   Active ; Category:   Drug ; Substance:   lisinopril ; Type:   Allergy ; Updated By:   Cesia oMrejon CMA; Reviewed Date:   9/24/2019 3:25 PM CDT        Medication List   (As Of: 2/12/2020 3:26:43 PM CST)   Prescription/Discharge Order    hydrochlorothiazide-losartan  :   hydrochlorothiazide-losartan ; Status:   Prescribed ; Ordered As Mnemonic:   hydrochlorothiazide-losartan 12.5 mg-50 mg oral tablet ; Simple Display Line:   1 tab(s), PO, Daily, 90 tab(s), 1 Refill(s) ; Ordering Provider:   Jj Grey MD; Catalog Code:   hydrochlorothiazide-losartan ; Order Dt/Tm:   1/14/2020 3:40:54 PM CST            Home Meds    triamcinolone  :   triamcinolone ; Status:   Documented ; Ordered As Mnemonic:   kenalog injection ; Simple Display Line:   Once monthly into scalp ; Catalog Code:   triamcinolone ; Order Dt/Tm:   1/26/2011  4:27:33 PM CST

## 2022-02-16 NOTE — PROGRESS NOTES
Seen for COVID testing at [facility] per Linda Goode PA-C    O2 Sat = 95%  (Children under 12 do not require O2 sat)    Specimen sent to:  [employer]    PUI form faxed to: [facility].

## 2022-02-16 NOTE — NURSING NOTE
Comprehensive Intake Entered On:  2/8/2021 11:23 AM CST    Performed On:  2/8/2021 11:20 AM CST by Kae Fisher MA               Summary   Chief Complaint :   verbal consent given for telemed visit.  c/o runny nose, cough since over the weekend.   Height Measured :   61 in(Converted to: 5 ft 1 in, 154.94 cm)    Height/Length Estimated :   61    Kae Fisher MA - 2/8/2021 11:20 AM CST   Health Status   Allergies Verified? :   Yes   Medication History Verified? :   Yes   Medical History Verified? :   Yes   Pre-Visit Planning Status :   Not completed   Tobacco Use? :   Current every day smoker   Kae Fisher MA - 2/8/2021 11:20 AM CST   Consents   Consent for Immunization Exchange :   Consent Granted   Consent for Immunizations to Providers :   Consent Granted   Kae Fisher MA - 2/8/2021 11:20 AM CST   Meds / Allergies   (As Of: 2/8/2021 11:23:49 AM CST)   Allergies (Active)   lisinopril  Estimated Onset Date:   Unspecified ; Reactions:   Cough ; Created By:   Cesia Morejon CMA; Reaction Status:   Active ; Category:   Drug ; Substance:   lisinopril ; Type:   Allergy ; Updated By:   Cesia Morejon CMA; Reviewed Date:   11/27/2020 2:11 PM CST        Medication List   (As Of: 2/8/2021 11:23:49 AM CST)   Prescription/Discharge Order    fluconazole  :   fluconazole ; Status:   Prescribed ; Ordered As Mnemonic:   fluconazole 150 mg oral tablet ; Simple Display Line:   150 mg, 1 tab(s), Oral, once, 1 tab(s), 6 Refill(s) ; Ordering Provider:   Jj Grey MD; Catalog Code:   fluconazole ; Order Dt/Tm:   7/30/2020 1:14:01 PM CDT          hydrochlorothiazide-losartan  :   hydrochlorothiazide-losartan ; Status:   Prescribed ; Ordered As Mnemonic:   hydrochlorothiazide-losartan 12.5 mg-50 mg oral tablet ; Simple Display Line:   1 tab(s), PO, Daily, 90 tab(s), 1 Refill(s) ; Ordering Provider:   Jj Grey MD; Catalog Code:   hydrochlorothiazide-losartan ; Order Dt/Tm:   7/30/2020 12:56:43 PM CDT             Home Meds    triamcinolone  :   triamcinolone ; Status:   Documented ; Ordered As Mnemonic:   kenalog injection ; Simple Display Line:   Once monthly into scalp ; Catalog Code:   triamcinolone ; Order Dt/Tm:   1/26/2011 4:27:33 PM CST            ID Risk Screen   Recent Travel History :   No recent travel   Family Member Travel History :   No recent travel   Other Exposure to Infectious Disease :   Unknown   COVID-19 Testing Status :   No positive COVID-19 test   Phillip LONGO, Kae - 2/8/2021 11:20 AM CST   Social History   Social History   (As Of: 2/8/2021 11:23:49 AM CST)   Alcohol:        Past, 1-2 times per month   (Last Updated: 11/30/2017 2:28:00 PM CST by Florecita Lemos)          Tobacco:        Smoker, current status unknown, Cigarettes, 5 per day.  30 year(s).   (Last Updated: 11/27/2020 2:00:51 PM CST by Gloria Robles LPN)          Electronic Cigarette/Vaping:        Electronic Cigarette Use: Never.   (Last Updated: 11/27/2020 2:00:57 PM CST by Gloria Robles LPN)          Substance Abuse:        Never   (Last Updated: 11/30/2017 2:28:18 PM CST by Florecita Lemos)          Employment/School:        Employed, Work/School description: .   (Last Updated: 10/9/2015 3:32:31 PM CDT by Ashley Ramey)          Home/Environment:        Marital status: .  Spouse/Partner name: Jovan.  Risks in environment: Unlocked guns.   (Last Updated: 11/30/2017 2:27:54 PM CST by Florecita Lemos)          Nutrition/Health:        Type of diet: Regular.   (Last Updated: 11/30/2017 2:28:22 PM CST by Florecita Lemos)          Exercise:        Exercise frequency: 1-2 times/week.  Exercise type: jogging.   (Last Updated: 10/9/2015 3:41:21 PM CDT by Ashley Ramey)          Sexual:        Sexually active: Yes.  Sexual orientation: Straight or heterosexual.  Contraceptive Use Details:  had vasectomy.   (Last Updated: 11/30/2017 2:28:35 PM CST by Florecita Lemos)

## 2022-02-16 NOTE — TELEPHONE ENCOUNTER
---------------------  From: Geni Anthony (Appointment Pool ([id number]))   To: Cesia Hardin;     Sent: 1/10/2020 8:08:43 AM CST  Subject: RE: General Message     DONE      ---------------------  From: Cesia Hardin   To: Appointment Pool ([id number]_[place] [facility] [place]);     Sent: 1/10/2020 7:33:15 AM CST  Subject: General Message     please change tuesdays appt to px.  last px 12/7/18

## 2022-02-16 NOTE — NURSING NOTE
Comprehensive Intake Entered On:  11/27/2020 2:03 PM CST    Performed On:  11/27/2020 1:58 PM CST by Gloria Robles LPN               Summary   Chief Complaint :   last night had some chest pain, improved over the night but heavy chest feeling come back again this afternoon.    Weight Measured :   123 lb(Converted to: 123 lb 0 oz, 55.792 kg)    Height Measured :   61 in(Converted to: 5 ft 1 in, 154.94 cm)    Body Mass Index :   23.24 kg/m2   Body Surface Area :   1.55 m2   Height/Length Estimated :   61    Systolic Blood Pressure :   126 mmHg   Diastolic Blood Pressure :   82 mmHg (HI)    Mean Arterial Pressure :   97 mmHg   Peripheral Pulse Rate :   87 bpm   BP Site :   Right arm   Pulse Site :   Radial artery   BP Method :   Manual   HR Method :   Manual   Temperature Tympanic :   98.5 DegF(Converted to: 36.9 DegC)    Respiratory Rate :   18 br/min   Oxygen Saturation :   98 %   Gloria Robles LPN - 11/27/2020 1:58 PM CST   Health Status   Allergies Verified? :   Yes   Medication History Verified? :   Yes   Pre-Visit Planning Status :   Completed   Tobacco Use? :   Current every day smoker   Tobacco Cessation Review :   Not ready to quit, Advised to quit   Gloria Robles LPN - 11/27/2020 1:58 PM CST   Consents   Consent for Immunization Exchange :   Consent Granted   Consent for Immunizations to Providers :   Consent Granted   Gloria Robles LPN - 11/27/2020 1:58 PM CST   Meds / Allergies   (As Of: 11/27/2020 2:03:09 PM CST)   Allergies (Active)   lisinopril  Estimated Onset Date:   Unspecified ; Reactions:   Cough ; Created By:   Cesia Morejon CMA; Reaction Status:   Active ; Category:   Drug ; Substance:   lisinopril ; Type:   Allergy ; Updated By:   Cesia Morejon CMA; Reviewed Date:   11/27/2020 2:02 PM CST        Medication List   (As Of: 11/27/2020 2:03:09 PM CST)   Prescription/Discharge Order    fluconazole  :   fluconazole ; Status:   Prescribed ; Ordered As Mnemonic:   fluconazole 150 mg  oral tablet ; Simple Display Line:   150 mg, 1 tab(s), Oral, once, 1 tab(s), 6 Refill(s) ; Ordering Provider:   Jj Grey MD; Catalog Code:   fluconazole ; Order Dt/Tm:   7/30/2020 1:14:01 PM CDT          hydrochlorothiazide-losartan  :   hydrochlorothiazide-losartan ; Status:   Prescribed ; Ordered As Mnemonic:   hydrochlorothiazide-losartan 12.5 mg-50 mg oral tablet ; Simple Display Line:   1 tab(s), PO, Daily, 90 tab(s), 1 Refill(s) ; Ordering Provider:   Jj Grey MD; Catalog Code:   hydrochlorothiazide-losartan ; Order Dt/Tm:   7/30/2020 12:56:43 PM CDT            Home Meds    triamcinolone  :   triamcinolone ; Status:   Documented ; Ordered As Mnemonic:   kenalog injection ; Simple Display Line:   Once monthly into scalp ; Catalog Code:   triamcinolone ; Order Dt/Tm:   1/26/2011 4:27:33 PM CST            ID Risk Screen   Recent Travel History :   No recent travel   Family Member Travel History :   No recent travel   Other Exposure to Infectious Disease :   Unknown   Gloria Robles LPN - 11/27/2020 1:58 PM CST   Social History   Social History   (As Of: 11/27/2020 2:03:09 PM CST)   Alcohol:        Past, 1-2 times per month   (Last Updated: 11/30/2017 2:28:00 PM CST by Florecita Lemos)          Tobacco:        Smoker, current status unknown, Cigarettes, 5 per day.  30 year(s).   (Last Updated: 11/27/2020 2:00:51 PM CST by Gloria Robles LPN)          Electronic Cigarette/Vaping:        Electronic Cigarette Use: Never.   (Last Updated: 11/27/2020 2:00:57 PM CST by Gloria Robles LPN)          Substance Abuse:        Never   (Last Updated: 11/30/2017 2:28:18 PM CST by Florecita Lemos)          Employment/School:        Employed, Work/School description: .   (Last Updated: 10/9/2015 3:32:31 PM CDT by Ashley Ramey)          Home/Environment:        Marital status: .  Spouse/Partner name: Jovan.  Risks in environment: Unlocked guns.   (Last Updated: 11/30/2017 2:27:54 PM  CST by Florecita Lemos)          Nutrition/Health:        Type of diet: Regular.   (Last Updated: 11/30/2017 2:28:22 PM CST by Florecita Lemos)          Exercise:        Exercise frequency: 1-2 times/week.  Exercise type: jogging.   (Last Updated: 10/9/2015 3:41:21 PM CDT by Ashley Ramey)          Sexual:        Sexually active: Yes.  Sexual orientation: Straight or heterosexual.  Contraceptive Use Details:  had vasectomy.   (Last Updated: 11/30/2017 2:28:35 PM CST by Florecita Lemos)

## 2022-02-16 NOTE — NURSING NOTE
Comprehensive Intake Entered On:  6/26/2019 3:01 PM CDT    Performed On:  6/26/2019 2:58 PM CDT by Renea Bell RN               Summary   Chief Complaint :   Patient is here for HTN check.    Weight Measured :   124.0 lb(Converted to: 124 lb 0 oz, 56.25 kg)    Systolic Blood Pressure :   120 mmHg   Diastolic Blood Pressure :   78 mmHg   Mean Arterial Pressure :   92 mmHg   Apical Heart Rate :   101 bpm (HI)    BP Site :   Right arm   BP Method :   Electronic   HR Method :   Electronic   Temperature Tympanic :   98.5 DegF(Converted to: 36.9 DegC)    Renea Bell RN - 6/26/2019 2:58 PM CDT   Health Status   Allergies Verified? :   Yes   Medication History Verified? :   Yes   Pre-Visit Planning Status :   Completed   Tobacco Use? :   Current every day smoker   Renea Bell RN - 6/26/2019 2:58 PM CDT   Consents   Consent for Immunization Exchange :   Consent Granted   Consent for Immunizations to Providers :   Consent Granted   Renea Bell RN - 6/26/2019 2:58 PM CDT   Meds / Allergies   (As Of: 6/26/2019 3:01:44 PM CDT)   Allergies (Active)   lisinopril  Estimated Onset Date:   Unspecified ; Reactions:   Cough ; Created By:   Cesia Morejon CMA; Reaction Status:   Active ; Category:   Drug ; Substance:   lisinopril ; Type:   Allergy ; Updated By:   Cesia Morejon CMA; Reviewed Date:   6/26/2019 3:00 PM CDT        Medication List   (As Of: 6/26/2019 3:01:44 PM CDT)   Prescription/Discharge Order    hydrochlorothiazide-losartan  :   hydrochlorothiazide-losartan ; Status:   Prescribed ; Ordered As Mnemonic:   hydrochlorothiazide-losartan 12.5 mg-50 mg oral tablet ; Simple Display Line:   1 tab(s), PO, Daily, 90 tab(s), 1 Refill(s) ; Ordering Provider:   Jj Grey MD; Catalog Code:   hydrochlorothiazide-losartan ; Order Dt/Tm:   12/7/2018 8:36:24 AM          fluconazole  :   fluconazole ; Status:   Prescribed ; Ordered As Mnemonic:   Diflucan 150 mg oral tablet ; Simple Display Line:   150 mg, 1 tab(s), PO, Once, 3  tab(s), 5 Refill(s) ; Ordering Provider:   Jj Grey MD; Catalog Code:   fluconazole ; Order Dt/Tm:   1/9/2019 9:49:41 AM            Home Meds    triamcinolone  :   triamcinolone ; Status:   Documented ; Ordered As Mnemonic:   kenalog injection ; Simple Display Line:   Once monthly into scalp ; Catalog Code:   triamcinolone ; Order Dt/Tm:   1/26/2011 4:27:33 PM          multivitamin with minerals  :   multivitamin with minerals ; Status:   Documented ; Ordered As Mnemonic:   Vitamin D with Minerals oral tablet ; Simple Display Line:   1 tab(s), po, daily, 0 Refill(s) ; Catalog Code:   multivitamin with minerals ; Order Dt/Tm:   6/28/2016 4:02:55 PM

## 2022-02-16 NOTE — LETTER
(Inserted Image. Unable to display)     Alfreda 10, 2019      BUFFY ALEX   [address], [place] [id number]          Dear BUFFY,      Thank you for selecting [facility] (previously [place] [place] [facility]) for your healthcare needs.      Our records indicate you are due for the following services:     Hypertension check ~ please remember to bring your at-home blood pressure readings with you to your appointment.       To schedule an appointment or if you have further questions, please contact your primary clinic:   [facility]       [phone]   [facility]       [phone]              [facility]     [phone]      Powered by [employer]    Sincerely,    Jj Grey MD

## 2022-02-16 NOTE — PROCEDURES
Accession Number:       [id number]  CLINICAL INFORMATION::     Normal exam  LMP::     11/06/17  PREV. PAP::     06/08/12  PREV. BX::     NONE GIVEN  SOURCE::     Cervix, Endocervix  STATEMENT OF ADEQUACY::     Satisfactory for evaluation. Endocervical/transformation zone component present.  INTERPRETATION/RESULT::     Negative for intraepithelial lesion or malignancy.  COMMENT::     This Pap test has been evaluated with computer assisted technology.  CYTOTECHNOLOGIST::     JAMES PANDA(ASCP) CT Screening location: [place] [id number]  HPV mRNA E6/E7:     See comment         Test not performed.       Due to low cellular content there       was not enough sample after Pap slide       preparation.

## 2022-02-16 NOTE — LETTER
(Inserted Image. Unable to display)     February 01, 2021      BUFFY ALEX   [address], [place] [id number]          Dear BUFFY,      Thank you for selecting [facility] (previously [facility]) for your healthcare needs.    Our records indicate you are due for the following services:     Hypertension check ~ please remember to bring your at-home blood pressure readings with you to your appointment.     (FYI   Regarding office visits: In some instances, a video visit or telephone visit may be offered as an option.)      To schedule an appointment or if you have further questions, please contact your clinic at [phone].      Powered by [employer]    Sincerely,    Jj Grey MD

## 2022-02-16 NOTE — PROGRESS NOTES
Patient:   BUFFY JOHNSON            MRN: [id number]            FIN: [id number]               Age:   46 years     Sex:  Female     :  1970   Associated Diagnoses:   HTN (hypertension)   Author:   Jj Grey MD      Visit Information      Date of Service: 2017 03:30 pm  Performing Location: [facility]  Encounter#: [id number]      Primary Care Provider (PCP):  Jj Grey MD    NPI# [id number]      Referring Provider:  No referring provider recorded for selected visit.      Chief Complaint   3/24/2017 3:51 PM CDT    Pt here for HTN recheck and medication refills        History of Present Illness             The patient presents for follow-up evaluation of hypertension, chief complaint and symptoms as noted above confirmed with patient .  Exacerbating factors consist of none.  Relieving factors consist of medication.     Gets occasional yeast infection      Review of Systems   Constitutional:  Negative except as documented in history of present illness.    Eye:  Negative.    Ear/Nose/Mouth/Throat:  Negative.    Respiratory:  Negative except as documented in history of present illness.    Cardiovascular:  Negative.    Gastrointestinal:  Negative.    Genitourinary:  Negative.    Hematology/Lymphatics:  Negative except as documented in history of present illness.    Endocrine:  Negative.    Immunologic:  Negative.    Musculoskeletal:  Negative.    Integumentary:  Negative except as documented in history of present illness.    Neurologic:  Negative.       Health Status   Allergies:    Allergic Reactions (Selected)  Severity Not Documented  Lisinopril (Cough)   Medications:  (Selected)   Prescriptions  Prescribed  Diflucan 150 mg oral tablet: 1 tab(s) ( 150 mg ), PO, Once, # 3 tab(s), 5 Refill(s), Type: Soft Stop, Pharmacy: [facility] #[id number], 1 tab(s) po once  Norco 5 mg-325 mg oral tablet: 1 tab(s), PO, q4hr, PRN: for pain, # 12 tab(s), 0 Refill(s), Type: Maintenance  losartan  50 mg oral tablet: 1 tab(s) ( 50 mg ), PO, Daily, # 90 tab(s), 1 Refill(s), Type: Maintenance, Pharmacy: [facility] #[id number], 1 tab(s) po daily  Documented Medications  Documented  Latisse: 1 ximena, top, hs, 0 Refill(s), Type: Maintenance  Vitamin D with Minerals oral tablet: 1 tab(s), po, daily, 0 Refill(s), Type: Maintenance  kenalog injection: Instructions: Once monthly into scalp, 0 Refill(s), Type: Maintenance   Problem list:    All Problems  Seasonal Allergies / ICD-9-.9 / Confirmed  Alopecia areata / SNOMED CT 267781166 / Confirmed  HTN (hypertension) / SNOMED CT 4503321533 / Confirmed  Resolved: Pregnancy / SNOMED CT 906577229      Histories   Past Medical History:    Active  Seasonal Allergies (477.9)  Alopecia areata (269709390)  Resolved  Pregnancy (687239810):  Resolved in 1998 at 27 years.   Family History:    Patient was adopted.   Asthma  Son (Jameson)     Procedure history:    Tonsillectomy (SNOMED CT 199281178) performed by Ramone Colon MD on 4/8/2011 at 40 Years.  laparoscopy for endometreosis on 1/20/2008 at 37 Years.  kenalog injections for hair loss.   Social History:        Alcohol Assessment: Current            1-2 times per month      Tobacco Assessment: Denies Tobacco Use            Past                     Comments:                      06/08/2012 - Michelle SOARES, Jillian Celeste 05/2012      Substance Abuse Assessment: Denies Substance Abuse      Employment and Education Assessment            Employed, Work/School description: .      Exercise and Physical Activity Assessment: Regular exercise            Exercise frequency: 1-2 times/week.  Exercise type: jogging.        Physical Examination   Vital Signs   3/24/2017 3:51 PM CDT Temperature Tympanic 98 DegF    Peripheral Pulse Rate 72 bpm    Pulse Site Radial artery    Respiratory Rate 16 br/min    Systolic Blood Pressure 110 mmHg    Diastolic Blood Pressure 68 mmHg    Mean Arterial Pressure 82 mmHg    BP Site  Right arm      Measurements from flowsheet : Measurements   3/24/2017 3:51 PM CDT Height Measured - Standard 61 in    Weight Measured - Standard 122 lb    BSA 1.54 m2    Body Mass Index 23.05 kg/m2      Neck:  Supple, Non-tender.    Respiratory:  Respirations are non-labored.    Cardiovascular:  Normal rate, Regular rhythm, No edema.    Neurologic:  Alert, Oriented.       Impression and Plan   Diagnosis     HTN (hypertension) (ZCN47-JE I10).     Course:  Improving, Progressing as expected.    Plan:  Monitor blood pressure, fu 6 mo, dc hctz.    Patient Instructions:       Counseled: Patient, Regarding diagnosis, Regarding treatment, Regarding medications, Activity.

## 2022-02-16 NOTE — PROGRESS NOTES
Patient:   BUFFY JOHNSON            MRN: [id number]            FIN: [id number]               Age:   49 years     Sex:  Female     :  1970   Associated Diagnoses:   Post-viral cough syndrome   Author:   Jamil Arce PA-C      Chief Complaint   2020 3:24 PM CST    Cough x2 weeks.  Worse at night, increased phlegm.      History of Present Illness   Chief complaint and symptoms noted above and confirmed with patient   2 week hx of wet cough, only present at night,  producing more phlegm  this started after having a head and chest cold  has had some emesis because of the phlegm  no fever, no head congestion, no heartburn  has tried mucinex         Review of Systems   Constitutional:  No fever.    Ear/Nose/Mouth/Throat:  No nasal congestion.    Respiratory:  Cough, Sputum production.    Gastrointestinal:  Vomiting, No nausea, No heartburn.       Health Status   Allergies:    Allergic Reactions (Selected)  Severity Not Documented  Lisinopril (Cough)   Medications:  (Selected)   Prescriptions  Prescribed  hydrochlorothiazide-losartan 12.5 mg-50 mg oral tablet: 1 tab(s), PO, Daily, # 90 tab(s), 1 Refill(s), Type: Maintenance, Pharmacy: [email or web address] #[id number], 1 tab(s) Oral daily  Documented Medications  Documented  kenalog injection: Instructions: Once monthly into scalp, 0 Refill(s), Type: Maintenance   Problem list:    All Problems (Selected)  Alopecia areata / SNOMED CT 493584584 / Confirmed  HTN (hypertension) / SNOMED CT 7990717400 / Confirmed  Tobacco user / SNOMED CT 058596974 / Probable  Seasonal allergies / SNOMED CT 452982967 / Confirmed      Histories   Past Medical History:    Active  Seasonal allergies (206710637)  Alopecia areata (376333434)  Resolved  Pregnancy (707741479):  Resolved in  at 27 years.   Family History:    Patient was adopted.   Asthma  Son (Jameson)     Procedure history:    Tonsillectomy (352138542) on 2011 at 40 Years.  laparoscopy for endometreosis on 2008 at 37  Years.  kenalog injections for hair loss.      Physical Examination   Vital Signs   2/12/2020 3:24 PM CST Temperature Tympanic 98.7 DegF    Peripheral Pulse Rate 76 bpm    Systolic Blood Pressure 94 mmHg    Diastolic Blood Pressure 60 mmHg    Mean Arterial Pressure 71 mmHg    Oxygen Saturation 100 %      Measurements from flowsheet : Measurements   2/12/2020 3:24 PM CST Height Measured - Standard 61 in    Height/Length Estimated 61    Weight Measured - Standard 126.6 lb    BSA 1.57 m2    Body Mass Index 23.92 kg/m2      General:  No acute distress.    HENT:  Tympanic membranes are clear, No pharyngeal erythema, No sinus tenderness, nares are patent.    Neck:  Supple, Non-tender, No lymphadenopathy.    Respiratory:  Lungs are clear to auscultation.    Cardiovascular:  Normal rate, Regular rhythm, No murmur.       Impression and Plan   Diagnosis     Post-viral cough syndrome (CXF77-KB R05).     Summary:  will treat with prednisone and benzonatate, continue with mucinex, follow up if not improving.    Orders     Orders   Pharmacy:  benzonatate 200 mg oral capsule (Prescribe): = 1 cap(s) ( 200 mg ), Oral, q8 hrs, PRN: as needed for cough, # 30 cap(s), 0 Refill(s), Type: Maintenance, Pharmacy: [facility] #[id number], 1 cap(s) Oral q8 hrs,PRN:as needed for cough  predniSONE 20 mg oral tablet (Prescribe): = 2 tab(s) ( 40 mg ), Oral, daily, x 5 day(s), Instructions: with food or milk, # 10 tab(s), 0 Refill(s), Type: Acute, Pharmacy: [facility] #[id number], 2 tab(s) Oral daily,x5 day(s),Instr:with food or milk.     Orders   Charges (Evaluation and Management):  19970 office outpatient visit 15 minutes (Charge) (Order): Quantity: 1, Post-viral cough syndrome.

## 2022-02-16 NOTE — PROGRESS NOTES
Patient:   BUFFY JOHNSON            MRN: [id number]            FIN: [id number]               Age:   50 years     Sex:  Female     :  1970   Associated Diagnoses:   HTN (hypertension)   Author:   Jj Grey MD      Visit Information      Date of Service: 2021 03:40 pm  Performing Location: [facility]  Encounter#: [id number]      Primary Care Provider (PCP):  Jj Grey MD    NPI# [id number]      Referring Provider:  Jj Grey MD# [id number]      Chief Complaint   2021 3:42 PM CST    Chronic disease visit        History of Present Illness             The patient presents for follow-up evaluation of hypertension, chief complaint and symptoms as noted above confirmed with patient .  The context of the hypertension: blood pressure was maintained within the target range.  Exacerbating factors consist of none.  Relieving factors consist of medication.  Associated symptoms consist of none.  Additional pertinent history: none.        Review of Systems   Constitutional:  Negative.    Eye:  Negative.    Ear/Nose/Mouth/Throat:  Negative.    Respiratory:  Negative.    Cardiovascular:  Negative.    Gastrointestinal:  Negative.    Genitourinary:  Negative.    Hematology/Lymphatics:  Negative.    Endocrine:  Negative.    Immunologic:  Negative.    Musculoskeletal:  Negative.    Integumentary:  Negative.    Neurologic:  Negative.       Health Status   Allergies:    Allergic Reactions (Selected)  Severity Not Documented  Lisinopril (Cough)   Medications:  (Selected)   Prescriptions  Prescribed  fluconazole 150 mg oral tablet: = 1 tab(s) ( 150 mg ), Oral, once, # 1 tab(s), 6 Refill(s), Type: Soft Stop, Pharmacy: [facility] #[id number], 1 tab(s) Oral once, 61, in, 20 9:23:00 CDT, Height Measured, Weight Measured  losartan 50 mg oral tablet: = 1 tab(s) ( 50 mg ), Oral, daily, # 90 tab(s), 1 Refill(s), Type: Maintenance, Pharmacy: [facility] #[id number], 1  tab(s) Oral daily, 61, in, 02/16/21 15:42:00 CST, Height Measured, 124, lb, 02/16/21 15:42:00 CST, Weight Measured  Documented Medications  Documented  kenalog injection: Instructions: Once monthly into scalp, 0 Refill(s), Type: Maintenance,    Medications          *denotes recorded medication          fluconazole 150 mg oral tablet: 150 mg, 1 tab(s), Oral, once, 1 tab(s), 6 Refill(s).          losartan 50 mg oral tablet: 50 mg, 1 tab(s), Oral, daily, 90 tab(s), 1 Refill(s).          *kenalog injection: Once monthly into scalp.       Problem list:    All Problems (Selected)  Alopecia areata / SNOMED CT 858810413 / Confirmed  HTN (hypertension) / SNOMED CT 7930961934 / Confirmed  Seasonal allergies / SNOMED CT 762462260 / Confirmed  Tobacco user / SNOMED CT 292898396 / Probable      Histories   Past Medical History:    Active  Seasonal allergies (522577196)  Alopecia areata (153544931)  Resolved  Pregnancy (929853277):  Resolved in 1998 at 27 years.   Family History:    Patient was adopted.   Asthma  Son (Jameson)     Procedure history:    Tonsillectomy (SNOMED CT 725788274) performed by Ramone Colon MD on 4/8/2011 at 40 Years.  laparoscopy for endometreosis on 1/20/2008 at 37 Years.  kenalog injections for hair loss.   Social History:        Electronic Cigarette/Vaping Assessment            Electronic Cigarette Use: Never.      Alcohol Assessment            Past, 1-2 times per month      Tobacco Assessment            Smoker, current status unknown, Cigarettes, 5 per day.  30 year(s).      Substance Abuse Assessment            Never      Employment and Education Assessment            Employed, Work/School description: .      Home and Environment Assessment            Marital status: .  Spouse/Partner name: Jovan.  Risks in environment: Unlocked guns.      Nutrition and Health Assessment            Type of diet: Regular.      Exercise and Physical Activity Assessment            Exercise frequency: 1-2  times/week.  Exercise type: jogging.      Sexual Assessment            Sexually active: Yes.  Sexual orientation: Straight or heterosexual.  Contraceptive Use Details:  had               vasectomy.        Physical Examination   Vital Signs   2/16/2021 3:42 PM CST Temperature Tympanic 98.0 DegF    Peripheral Pulse Rate 76 bpm    HR Method Electronic    Systolic Blood Pressure 108 mmHg    Diastolic Blood Pressure 70 mmHg    Mean Arterial Pressure 83 mmHg    BP Method Electronic      Measurements from flowsheet : Measurements   2/16/2021 3:42 PM CST Height Measured - Standard 61 in    Height/Length Estimated 61    Weight Measured - Standard 124.0 lb    BSA 1.55 m2    Body Mass Index 23.43 kg/m2      Eye:  Pupils are equal, round and reactive to light, Extraocular movements are intact.    Neck:  Supple, Non-tender.    Respiratory:  Lungs are clear to auscultation, Respirations are non-labored.    Cardiovascular:  Normal rate, Regular rhythm, No edema.    Gastrointestinal:  Soft, Non-tender, No organomegaly.    Integumentary:  No rash.    Neurologic:  Alert, Oriented, Cranial Nerves II-XII are grossly intact.       Health Maintenance      Recommendations     Pending (in the next year)        OverDue           Breast Cancer Screen due  12/19/19  and every 1  year(s)           Influenza Vaccine due  09/01/20  and every 1  year(s)           Lipid Disorders Screen (Female) due  01/09/21  and every 1  year(s)        Due            Aspirin Therapy for Prevention of CVD and CRC due  02/16/21  and every 5  year(s)           Colorectal Cancer Screen (Colonoscopy) (Female) due  02/16/21  Variable frequency           Colorectal Cancer Screen (Occult Blood) (Female) due  02/16/21  Variable frequency           Colorectal Cancer Screen (Sigmoidoscopy) (Female) due  02/16/21  Variable frequency        Due In Future            Alcohol Misuse Screen not due until  07/30/21  and every 1  year(s)           Depression Screen (Female)  not due until  07/30/21  and every 1  year(s)     Satisfied (in the past 1 year)        Satisfied            Alcohol Misuse Screen on  07/30/20.           Body Mass Index Check on  02/16/21.           Body Mass Index Check on  11/27/20.           Body Mass Index Check on  07/30/20.           Depression Screen (Female) on  07/30/20.           Depression Screen (Female) on  07/30/20.           Depression Screen (Female) on  07/30/20.           High Blood Pressure Screen (Female) on  02/16/21.           High Blood Pressure Screen (Female) on  11/27/20.           High Blood Pressure Screen (Female) on  07/30/20.          Review / Management   Results review      Impression and Plan   Diagnosis     HTN (hypertension) (TAO50-IO I10).     Course:  Progressing as expected.    Plan:  Monitor blood pressure, fu 6 mo, diflucan for occasional yeast inf, cologuard  cardiac calcium score in 6 months  DC HCTZ  .    Patient Instructions:       Counseled: Patient, Regarding diagnosis, Regarding treatment, Regarding medications, Activity, Smoking cessation.

## 2022-02-16 NOTE — PROGRESS NOTES
Patient:   BUFFY JOHNSON            MRN: [id number]            FIN: [id number]               Age:   47 years     Sex:  Female     :  1970   Associated Diagnoses:   Bacterial infection of skin   Author:   Jj Grey MD      Visit Information      Date of Service: 2018 03:15 pm  Performing Location: [facility]  Encounter#: [id number]      Primary Care Provider (PCP):  Jj Grey MD    NPI# [id number]      Referring Provider:  Jj Grey MD, NPI# [id number]      Chief Complaint   2018 3:19 PM CDT    Hard spot/boil on right ear.        History of Present Illness   chief complaint and symptoms as noted above confirmed with patient  1 week no dc      Review of Systems   Integumentary:  Negative except as documented in history of present illness.       Health Status   Allergies:    Allergic Reactions (Selected)  Severity Not Documented  Lisinopril (Cough)   Medications:  (Selected)   Prescriptions  Prescribed  Bactrim  mg-160 mg oral tablet: 1 tab(s), PO, BID, # 20 tab(s), 0 Refill(s), Type: Maintenance, Pharmacy: [id number], 1 tab(s) Oral bid,x10 day(s)  Diflucan 150 mg oral tablet: 1 tab(s) ( 150 mg ), PO, Once, # 3 tab(s), 6 Refill(s), Type: Soft Stop, Pharmacy: [facility] #[id number], 1 tab(s) po once  hydrochlorothiazide-losartan 12.5 mg-50 mg oral tablet: 1 tab(s), PO, Daily, # 90 tab(s), 3 Refill(s), Type: Maintenance, Pharmacy: [id number], 1 tab(s) po daily  Documented Medications  Documented  Vitamin D with Minerals oral tablet: 1 tab(s), po, daily, 0 Refill(s), Type: Maintenance  kenalog injection: Instructions: Once monthly into scalp, 0 Refill(s), Type: Maintenance   Problem list:    All Problems (Selected)  Alopecia areata / SNOMED CT 721012211 / Confirmed  HTN (hypertension) / SNOMED CT 5856418161 / Confirmed  Seasonal allergies / SNOMED CT 310736899 / Confirmed  Tobacco user / SNOMED CT 605476361 / Probable      Histories    Past Medical History:    Active  Seasonal allergies (136596232)  Alopecia areata (690237627)  Resolved  Pregnancy (177739741):  Resolved in 1998 at 27 years.   Family History:    Patient was adopted.   Asthma  Son (Jameson)     Procedure history:    Tonsillectomy (SNOMED CT 306141992) performed by Ramone Colon MD on 4/8/2011 at 40 Years.  laparoscopy for endometreosis on 1/20/2008 at 37 Years.  kenalog injections for hair loss.   Social History:        Alcohol Assessment            Past, 1-2 times per month      Tobacco Assessment            Smoker, current status unknown, Cigarettes, 5 per day.  30 year(s).      Substance Abuse Assessment            Never      Employment and Education Assessment            Employed, Work/School description: .      Home and Environment Assessment            Marital status: .  Spouse/Partner name: Jovan.  Risks in environment: Unlocked guns.      Nutrition and Health Assessment            Type of diet: Regular.      Exercise and Physical Activity Assessment            Exercise frequency: 1-2 times/week.  Exercise type: jogging.      Sexual Assessment            Sexually active: Yes.  Sexual orientation: Straight or heterosexual.  Contraceptive Use Details:  had               vasectomy.        Physical Examination   Vital Signs   9/12/2018 3:19 PM CDT Temperature Tympanic 98.5 DegF    Peripheral Pulse Rate 80 bpm    HR Method Electronic    Systolic Blood Pressure 110 mmHg    Diastolic Blood Pressure 76 mmHg    Mean Arterial Pressure 87 mmHg    BP Method Electronic      Measurements from flowsheet : Measurements   9/12/2018 3:19 PM CDT    Weight Measured - Standard                120.6 lb     General:  Alert and oriented, No acute distress.    Integumentary:  tender indurated area right upper ear .    Neurologic:  Alert, Oriented.       Impression and Plan   Diagnosis     Bacterial infection of skin (GSO42-FS L08.9).     Course:  Not progressing as expected.     Plan:  Wound therapy, tmps  fu 1 week if not better sooner if worse.    Patient Instructions:       Counseled: Patient, Regarding diagnosis, Regarding medications, Activity.

## 2022-02-16 NOTE — TELEPHONE ENCOUNTER
Entered by Alisson Pandey CMA on July 15, 2020 10:35:46 AM CDT  PCP:   [facility]    Medication:   Fluconazole- not on med list  Last Filled:  _    Quantity:  _  Refills:  _    Date of last office visit and reason:   2/12/2020 viral cough  Date of last labs pertaining to condition:  n/a    Note:  Please advise on refill    Return to Clinic order placed?  yes, due now for 6mo f/u    Resource:   eRx pool  Phone:   [phone]      ------------------------------------------  From: [facility] #[id number]  To: Jj Grey MD  Sent: July 14, 2020 5:07:41 PM CDT  Subject: Medication Management  Due: June 24, 2020 10:20:04 PM CDT     ** On Hold Pending Signature **     Dispensed Drug: fluconazole (fluconazole 150 mg oral tablet), TAKE 1 TABLET BY MOUTH 1 TIME  Quantity: 3 tab(s)  Days Supply: 3  Refills: 0  Substitutions Allowed  Notes from Pharmacy:  ---------------------------------------------------------------  From: Alisson Pandey CMA (eRx Pool ([id number]_[facility]))   To: [place] Message Pool (32224_[place] [facility] [place]);     Sent: 7/15/2020 10:35:52 AM CDT  Subject: FW: Medication Management   Due Date/Time: 7/15/2020 5:07:00 PM CDT---------------------  From: Cesia Hardin ([place] Message Pool (32224_[facility]))   To: Jj Grey MD;     Sent: 7/15/2020 11:23:11 AM CDT  Subject: FW: Medication Management   Due Date/Time: 7/15/2020 5:07:00 PM CDT---------------------  From: Jj Grey MD   To: [facility] #[id number]    Sent: 7/16/2020 10:45:48 AM CDT  Subject: FW: Medication Management     ** Approved with modifications: **  fluconazole (FLUCONAZOLE 150MG TABLETS)  TAKE 1 TABLET BY MOUTH 1 TIME  Qty:  3 tab(s)        Days Supply:  3        Refills:  0          Substitutions Allowed     Route To Pharmacy - [facility] #[id number]

## 2022-02-16 NOTE — NURSING NOTE
Comprehensive Intake Entered On:  7/30/2020 9:27 AM CDT    Performed On:  7/30/2020 9:23 AM CDT by Arti Jasso LPN               Summary   Chief Complaint :   f/u on BP   Weight Measured :   122.6 lb(Converted to: 122 lb 10 oz, 55.61 kg)    Height Measured :   61 in(Converted to: 5 ft 1 in, 154.94 cm)    Body Mass Index :   23.16 kg/m2   Body Surface Area :   1.55 m2   Height/Length Estimated :   61    Systolic Blood Pressure :   110 mmHg   Diastolic Blood Pressure :   68 mmHg   Mean Arterial Pressure :   82 mmHg   Peripheral Pulse Rate :   82 bpm   BP Site :   Right arm   BP Method :   Manual   Temperature Tympanic :   97.9 DegF(Converted to: 36.6 DegC)    Oxygen Saturation :   99 %   Arti Jasso LPN - 7/30/2020 9:23 AM CDT   Health Status   Allergies Verified? :   Yes   Medication History Verified? :   Yes   Medical History Verified? :   No   Pre-Visit Planning Status :   Completed   Tobacco Use? :   Current every day smoker   Arti Jasso LPN - 7/30/2020 9:23 AM CDT   Meds / Allergies   (As Of: 7/30/2020 9:27:10 AM CDT)   Allergies (Active)   lisinopril  Estimated Onset Date:   Unspecified ; Reactions:   Cough ; Created By:   Cesia Morejon CMA; Reaction Status:   Active ; Category:   Drug ; Substance:   lisinopril ; Type:   Allergy ; Updated By:   Cesia Morejon CMA; Reviewed Date:   2/12/2020 3:30 PM CST        Medication List   (As Of: 7/30/2020 9:27:10 AM CDT)   Prescription/Discharge Order    hydrochlorothiazide-losartan  :   hydrochlorothiazide-losartan ; Status:   Prescribed ; Ordered As Mnemonic:   hydrochlorothiazide-losartan 12.5 mg-50 mg oral tablet ; Simple Display Line:   1 tab(s), PO, Daily, 90 tab(s), 1 Refill(s) ; Ordering Provider:   Jj Grey MD; Catalog Code:   hydrochlorothiazide-losartan ; Order Dt/Tm:   1/14/2020 3:40:54 PM CST          fluconazole  :   fluconazole ; Status:   Prescribed ; Ordered As Mnemonic:   fluconazole 150 mg oral tablet ; Simple Display Line:    See Instructions, TAKE 1 TABLET BY MOUTH 1 TIME, 3 tab(s), 0 Refill(s) ; Ordering Provider:   Jj Grey MD; Catalog Code:   fluconazole ; Order Dt/Tm:   7/16/2020 10:45:40 AM CDT            Home Meds    triamcinolone  :   triamcinolone ; Status:   Documented ; Ordered As Mnemonic:   kenalog injection ; Simple Display Line:   Once monthly into scalp ; Catalog Code:   triamcinolone ; Order Dt/Tm:   1/26/2011 4:27:33 PM CST            ID Risk Screen   Recent Travel History :   No recent travel   Family Member Travel History :   No recent travel   Other Exposure to Infectious Disease :   Unknown   Arti Jasso LPN - 7/30/2020 9:23 AM CDT

## 2022-02-16 NOTE — NURSING NOTE
Comprehensive Intake Entered On:  12/23/2021 8:05 AM CST    Performed On:  12/23/2021 8:01 AM CST by Yvonne Arora CMA               Summary   Chief Complaint :   Rectal irritation - itches at night. Normal BMs. Hx of hemorrhoids.    Weight Measured :   132 lb(Converted to: 132 lb 0 oz, 59.874 kg)    Height/Length Estimated :   61    Systolic Blood Pressure :   122 mmHg   Diastolic Blood Pressure :   85 mmHg (HI)    Mean Arterial Pressure :   97 mmHg   Peripheral Pulse Rate :   99 bpm   BP Site :   Right arm   Pulse Site :   Radial artery   BP Method :   Electronic   HR Method :   Electronic   Yvonne Arora CMA - 12/23/2021 8:01 AM CST   Health Status   Allergies Verified? :   Yes   Medication History Verified? :   Yes   Pre-Visit Planning Status :   Not completed   Yvonne Arora CMA - 12/23/2021 8:01 AM CST   Meds / Allergies   (As Of: 12/23/2021 8:05:13 AM CST)   Allergies (Active)   lisinopril  Estimated Onset Date:   Unspecified ; Reactions:   Cough ; Created By:   Cesia Morejon CMA; Reaction Status:   Active ; Category:   Drug ; Substance:   lisinopril ; Type:   Allergy ; Updated By:   Cesia Morejon CMA; Reviewed Date:   9/21/2021 3:49 PM CDT        Medication List   (As Of: 12/23/2021 8:05:13 AM CST)   Prescription/Discharge Order    fluconazole  :   fluconazole ; Status:   Prescribed ; Ordered As Mnemonic:   fluconazole 150 mg oral tablet ; Simple Display Line:   150 mg, 1 tab(s), Oral, once, 1 tab(s), 6 Refill(s) ; Ordering Provider:   Jj Grey MD; Catalog Code:   fluconazole ; Order Dt/Tm:   9/21/2021 4:08:11 PM CDT          hydrochlorothiazide-losartan  :   hydrochlorothiazide-losartan ; Status:   Prescribed ; Ordered As Mnemonic:   hydrochlorothiazide-losartan 12.5 mg-50 mg oral tablet ; Simple Display Line:   1 tab(s), PO, Daily, 90 tab(s), 1 Refill(s) ; Ordering Provider:   Jj Grey MD; Catalog Code:   hydrochlorothiazide-losartan ; Order Dt/Tm:   9/21/2021 4:08:12 PM CDT             Home Meds    triamcinolone  :   triamcinolone ; Status:   Documented ; Ordered As Mnemonic:   kenalog injection ; Simple Display Line:   Once monthly into scalp ; Catalog Code:   triamcinolone ; Order Dt/Tm:   1/26/2011 4:27:33 PM CST            Social History   Social History   (As Of: 12/23/2021 8:05:13 AM CST)   Alcohol:        Past, 1-2 times per month   (Last Updated: 11/30/2017 2:28:00 PM CST by Florecita Lemos)          Tobacco:        Smoker, current status unknown, Cigarettes, 5 per day.  30 year(s).   (Last Updated: 11/27/2020 2:00:51 PM CST by Gloria Robles LPN)   5-9 cigarettes (between 1/4 to 1/2 pack)/day in last 30 days, Cigarettes   (Last Updated: 12/23/2021 8:04:13 AM CST by Yvonne Arora CMA)          Electronic Cigarette/Vaping:        Electronic Cigarette Use: Never.   (Last Updated: 11/27/2020 2:00:57 PM CST by Gloria Robles LPN)   Electronic Cigarette Use: Never.   (Last Updated: 12/23/2021 8:04:18 AM CST by Yvonne Arora CMA)          Substance Abuse:        Never   (Last Updated: 11/30/2017 2:28:18 PM CST by Florecita Lemos)          Employment/School:        Employed, Work/School description: .   (Last Updated: 10/9/2015 3:32:31 PM CDT by Ashley Ramey)          Home/Environment:        Marital status: .  Spouse/Partner name: Jovan.  Risks in environment: Unlocked guns.   (Last Updated: 11/30/2017 2:27:54 PM CST by Florecita Lemos)          Nutrition/Health:        Type of diet: Regular.   (Last Updated: 11/30/2017 2:28:22 PM CST by Florecita Lemos)          Exercise:        Exercise frequency: 1-2 times/week.  Exercise type: jogging.   (Last Updated: 10/9/2015 3:41:21 PM CDT by Ashley Ramey)          Sexual:        Sexually active: Yes.  Sexual orientation: Straight or heterosexual.  Contraceptive Use Details:  had vasectomy.   (Last Updated: 11/30/2017 2:28:35 PM CST by Florecita Lemos)

## 2022-02-16 NOTE — TELEPHONE ENCOUNTER
---------------------  From: Renea Bell RN   Sent: 2/10/2021 1:20:31 PM CST  Subject: Med Refill Call     Patient called stating she is almost out of Bp medication but does not see [facility]FS until next week. 30 day supply sent per protocol.   Returned call - patient's voicemail is full, unable to leave message.

## 2022-02-16 NOTE — NURSING NOTE
Depression Screening Entered On:  7/30/2020 1:15 PM CDT    Performed On:  7/30/2020 1:15 PM CDT by Arti Jasso LPN               Depression Screening   Little Interest - Pleasure in Activities :   Not at all   Feeling Down, Depressed, Hopeless :   Not at all   Initial Depression Screen Score :   0 Score   Poor Appetite or Overeating :   Not at all   Trouble Falling or Staying Asleep :   Not at all   Feeling Tired or Little Energy :   Not at all   Feeling Bad About Yourself :   Not at all   Trouble Concentrating :   Not at all   Moving or Speaking Slowly :   Not at all   Thoughts Better Off Dead or Hurting Self :   Not at all   Detailed Depression Screen Score :   0    Total Depression Screen Score :   0    Arti Jasso LPN - 7/30/2020 1:15 PM CDT

## 2022-02-16 NOTE — PROGRESS NOTES
Patient:   BUFFY JOHNSON            MRN: [id number]            FIN: [id number]               Age:   49 years     Sex:  Female     :  1970   Associated Diagnoses:   Right elbow pain   Author:   Ronald Conrad MD      Visit Information      Date of Service: 2019 03:20 pm  Performing Location: [facility]  Encounter#: [id number]      Primary Care Provider (PCP):  Jj Grey MD    NPI# [id number]      Referring Provider:  Ronald Conrad MD    NPI# [id number]      Chief Complaint   2019 3:24 PM CDT    Work comp f/u on right elbow pain        History of Present Illness   Pulling a strap and hit her elbow on wheelchair in back of her. Injury happened about six weeks ago. Seen three weeks ago and not improved at that time. Now feels about 50-70% better. Shooting pains are gone. Has some discomfort with using it and turning elbow. Grasping things is a little easier. Declined Physical therapy.      Review of Systems   No weakness  No numbness or tingling      Health Status   Allergies:    Allergic Reactions (Selected)  Severity Not Documented  Lisinopril (Cough)      Histories   Social history: works at [employer]      Physical Examination   Vital Signs   2019 3:24 PM CDT Peripheral Pulse Rate 84 bpm    Systolic Blood Pressure 110 mmHg    Diastolic Blood Pressure 72 mmHg    Mean Arterial Pressure 85 mmHg    BP Site Left arm    BP Method Manual    Oxygen Saturation 98 %      Measurements from flowsheet : Measurements   2019 3:24 PM CDT Height Measured - Standard 61 in    Height/Length Estimated 61    Weight Measured - Standard 125 lb    BSA 1.56 m2    Body Mass Index 23.62 kg/m2      General:  Alert and oriented, No acute distress.    Musculoskeletal:  tender lateral right epicondyle. Pain greater with wrist extension.    Integumentary:  no bruising or swelling.       Impression and Plan   Diagnosis     Right elbow pain (DIA96-OU M25.521).       Possible epicondylitis vs  tendinopathy: Continue Elbow brace. Follow up in 3 months. Patient does not feel she needs restrictions currently.

## 2022-02-16 NOTE — NURSING NOTE
Phone Message    PCP:   [facility]      Time of Call:  0824       Person Calling:  Pt  Phone number:  [phone]    Returned call at: 0835    Note:   Patient called stating she was in need of a refill of her Losartan. She states she has appointment with [facility]FS for refills next week but only has a couple pills left. Returned call to verify that she is taking Hydrochlorothiazide - Losartan. Sent in 2 weeks supply to [facility] per patient request.     Last office visit and reason:  9-24-19 R Elbow Pain f/u w/[facility]

## 2022-02-16 NOTE — NURSING NOTE
Comprehensive Intake Entered On:  2021 3:28 PM CDT    Performed On:  2021 3:24 PM CDT by Yvonne Arora CMA               Summary   Chief Complaint :   HTN f/u and refills.    Weight Measured :   131 lb(Converted to: 131 lb 0 oz, 59.421 kg)    Systolic Blood Pressure :   114 mmHg   Diastolic Blood Pressure :   78 mmHg   Mean Arterial Pressure :   90 mmHg   Peripheral Pulse Rate :   84 bpm   BP Site :   Right arm   Pulse Site :   Radial artery   BP Method :   Electronic   HR Method :   Electronic   Temperature Tympanic :   98.7 DegF(Converted to: 37.1 DegC)    Gene Arora CMAsey - 2021 3:24 PM CDT   Health Status   Allergies Verified? :   Yes   Medication History Verified? :   Yes   Pre-Visit Planning Status :   Completed   Tobacco Use? :   Current every day smoker   Tobacco Cessation Review :   Not ready to quit   [place] VITALIY Yvonne - 2021 3:24 PM CDT   Demographics   Last Name :   Arley   Address :   [address]   First Name :   Valencia   Responsible Party Date of Birth () :   1970 CDT   City :   [place]   State :   [place]   Zip Code :   [address]   Contact Relationship to Patient (other) :   Patient   Yvonne Arora CMA - 2021 3:24 PM CDT   Providers Grid   Provider Name :    Linda Donohue           Provider Specialty :    Dermatologist   Dentist           Comments :    [facility]                [place]ne denton CMA - 2021 3:24 PM CDT  [place] dent[place] CMA - 2021 3:24 PM CDT        Consents   Consent for Immunization Exchange :   Consent Granted   Consent for Immunizations to Providers :   Consent Granted   [place] Yvonne BURKS - 2021 3:24 PM CDT   Meds / Allergies   (As Of: 2021 3:28:25 PM CDT)   Allergies (Active)   lisinopril  Estimated Onset Date:   Unspecified ; Reactions:   Cough ; Created By:   Cesia Morejon CMA; Reaction Status:   Active ; Category:   Drug ; Substance:   lisinopril ; Type:   Allergy ;  Updated By:   Cesia Morejon CMA; Reviewed Date:   2/16/2021 4:00 PM CST        Medication List   (As Of: 9/21/2021 3:28:25 PM CDT)   Prescription/Discharge Order    fluconazole  :   fluconazole ; Status:   Prescribed ; Ordered As Mnemonic:   fluconazole 150 mg oral tablet ; Simple Display Line:   150 mg, 1 tab(s), Oral, once, 1 tab(s), 6 Refill(s) ; Ordering Provider:   Jj Grey MD; Catalog Code:   fluconazole ; Order Dt/Tm:   7/30/2020 1:14:01 PM CDT          hydrochlorothiazide-losartan  :   hydrochlorothiazide-losartan ; Status:   Prescribed ; Ordered As Mnemonic:   hydrochlorothiazide-losartan 12.5 mg-50 mg oral tablet ; Simple Display Line:   1 tab(s), PO, Daily, 90 tab(s), 1 Refill(s) ; Ordering Provider:   Jj Grey MD; Catalog Code:   hydrochlorothiazide-losartan ; Order Dt/Tm:   3/27/2021 12:16:50 PM CDT            Home Meds    triamcinolone  :   triamcinolone ; Status:   Documented ; Ordered As Mnemonic:   kenalog injection ; Simple Display Line:   Once monthly into scalp ; Catalog Code:   triamcinolone ; Order Dt/Tm:   1/26/2011 4:27:33 PM CST

## 2022-02-16 NOTE — PROGRESS NOTES
Chief Complaint    verbal consent given for telemed visit.  c/o runny nose, cough since over the weekend.  History of Present Illness      Today's visit was conducted via telephone due to the COVID-19 pandemic. PT consent to telephone visit was obtained and documented .  PT later seen at [facility] for testing and further recommendations by myself face to face.       Call Start Time:  1124      Call End Time:   1135      Provider location: clinic office       Pt location:  home in [place]      Chief complaint as above reviewed and confirmed with patient.  Pt presents to the clinic with concerns re: uri sx of rhinorrhea, congestion, mild cough.  Clear rhinorrhea, large amounts.  Otherwise feels well without any fever, muscle aches, fatigue, chest pain, sob, difficulty breathing.  Does have a loss ofsmell but not sure if that is associated with the rhinorrhea.       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 Very close contact required feeding, assisting with dressing, self cares, etc.   Review of Systems      Review of systems is negative with the exception of those noted in HPI          Physical Exam   Vitals & Measurements    HT: 61 in  HT: 61   Assessment/Plan       Cough (R05)         conservative measures discussed.   Push fluids, rest and ibuprofen or tylenol for comfort.  fu for persistent or worsening sx.  covid 19 test today, isolate at home awaiting results.  Work note provided.         Ordered:          SARS-CoV-2 RNA (COVID-19), Qualitative NAAT (Request), Specimen Type: Anterior Nares, Cough  Rhinorrhea  Encounter for screening for COVID-19                Encounter for screening for COVID-19 (Z11.52)         Ordered:          SARS-CoV-2 RNA (COVID-19), Qualitative NAAT (Request), Specimen Type: Anterior Nares, Cough  Rhinorrhea  Encounter for screening for COVID-19                Rhinorrhea (J34.89)         Ordered:          SARS-CoV-2 RNA (COVID-19), Qualitative  NAAT (Request), Specimen Type: Anterior Nares, Cough  Rhinorrhea  Encounter for screening for COVID-19           Patient Information     Name:BUFFY JOHNSON      Address:      [address] [id number]     Sex:Female     YOB: 1970     [phone]     Emergency Contact:JOVAN JOHNSON     MRN:[id number]     FIN:[id number]     Location:[facility]     Date of Service:02/08/2021      Primary Care Physician:       Jj Grey MD, [phone]      Attending Physician:       Linda Goode PA-C, [phone]  Problem List/Past Medical History    Ongoing     Alopecia areata     HTN (hypertension)     Seasonal allergies     Tobacco user    Historical     Pregnancy  Procedure/Surgical History     Tonsillectomy (04/08/2011)           laparoscopy for endometreosis (01/20/2008)           kenalog injections for hair loss        Medications    fluconazole 150 mg oral tablet, 150 mg= 1 tab(s), Oral, once, 6 refills    hydrochlorothiazide-losartan 12.5 mg-50 mg oral tablet, 1 tab(s), Oral, daily, 1 refills    kenalog injection  Allergies    lisinopril (Cough)  Social History    Smoking Status     Current every day smoker     Alcohol      Past, 1-2 times per month     Electronic Cigarette/Vaping      Electronic Cigarette Use: Never.     Employment/School      Employed, Work/School description: .     Exercise      Exercise frequency: 1-2 times/week. Exercise type: jogging.     Home/Environment      Marital status: . Spouse/Partner name: Jovan. Risks in environment: Unlocked guns.     Nutrition/Health      Type of diet: Regular.     Sexual      Sexually active: Yes. Sexual orientation: Straight or heterosexual. Contraceptive Use Details:  had vasectomy.     Substance Abuse      Never     Tobacco      Smoker, current status unknown, Cigarettes, 5 per day. 30 year(s).  Family History    Patient was adopted    Asthma:  Son.  Immunizations      Vaccine Date Status          tetanus/diphth/pertuss (Tdap) adult/adol 10/24/2017 Given          Td 08/31/2005 Recorded          Td 01/01/1994 Recorded  Lab Results       Lab Results (Last 4 results within 90 days)        Sodium Level: 139 [135 mmol/L - 145 mmol/L] (11/27/20 14:39:00)       Potassium Level: 3.7 [3.5 mmol/L - 5 mmol/L] (11/27/20 14:39:00)       Chloride Level: 105 [98 mmol/L - 110 mmol/L] (11/27/20 14:39:00)       CO2 Level: 27 [21 mmol/L - 31 mmol/L] (11/27/20 14:39:00)       AGAP: 7 [5  - 18] (11/27/20 14:39:00)       Glucose Level: 82 [65 mg/dL - 100 mg/dL] (11/27/20 14:39:00)       BUN: 11 [8 mg/dL - 25 mg/dL] (11/27/20 14:39:00)       Creatinine Level: 0.71 [0.57 mg/dL - 1.11 mg/dL] (11/27/20 14:39:00)       BUN/Creat Ratio: 15 [10  - 20] (11/27/20 14:39:00)       eGFR : >60 (11/27/20 14:39:00)       eGFR Non-: >60 (11/27/20 14:39:00)       Calcium Level: 8.9 [8.5 mg/dL - 10.5 mg/dL] (11/27/20 14:39:00)       Troponin I: <0.010 (11/27/20 14:39:00)       WBC: 8.7 [4.5  - 11] (11/27/20 14:39:00)       RBC: 4.62 [4  - 5.2] (11/27/20 14:39:00)       Hgb: 14.1 [12 g/dL - 16 g/dL] (11/27/20 14:39:00)       Hct: 42.1 [33 % - 51 %] (11/27/20 14:39:00)       MCV: 91 [80 fL - 100 fL] (11/27/20 14:39:00)       MCH: 30.5 [26 pg - 34 pg] (11/27/20 14:39:00)       MCHC: 33.5 [32 g/dL - 36 g/dL] (11/27/20 14:39:00)       RDW: 14.1 [11.5 % - 15.5 %] (11/27/20 14:39:00)       Platelet: 198 [140  - 440] (11/27/20 14:39:00)       MPV: 11.0 [6.5 fL - 11 fL] (11/27/20 14:39:00)       D-Dimer: 0.35 (11/27/20 14:39:00)

## 2022-02-16 NOTE — PROGRESS NOTES
Patient:   BUFFY JOHNSON            MRN: [id number]            FIN: [id number]               Age:   50 years     Sex:  Female     :  1970   Associated Diagnoses:   None   Author:   Akash Esets MD      Procedure   EKG procedure   Date/ Time:  2020 3:15:00 PM.     Confirmed: patient.     Performed by: nurse.     Indication: chest pain.     EKG findings   Interpretation: Akash Estes MD.     Rhythm: heart rate  80  beats/min.     Axis: normal axis, normal configuration.     Intervals: normal.     P waves: normal.     QRS complex: normal.     ST-T-U complex: normal.     Interpretation: normal EKG.

## 2022-02-16 NOTE — PROGRESS NOTES
Patient:   BUFFY JOHNSON            MRN: [id number]            FIN: [id number]               Age:   49 years     Sex:  Female     :  1970   Associated Diagnoses:   HTN (hypertension)   Author:   jJ Grey MD      Visit Information      Date of Service: 2020 03:20 pm  Performing Location: [facility]  Encounter#: [id number]      Primary Care Provider (PCP):  Jj Grey MD    NPI# [id number]      Referring Provider:  Jj Grey MD, NPI# [id number]      Chief Complaint      History of Present Illness             The patient presents for follow-up evaluation of hypertension, chief complaint and symptoms as noted above confirmed with patient .  The context of the hypertension: blood pressure was maintained within the target range.  Exacerbating factors consist of none.  Relieving factors consist of medication.  Associated symptoms consist of none.  Additional pertinent history: none.        Review of Systems   Constitutional:  Negative.    Eye:  Negative.    Ear/Nose/Mouth/Throat:  Negative.    Respiratory:  Negative.    Cardiovascular:  Negative.    Gastrointestinal:  Negative.    Genitourinary:  Negative.    Hematology/Lymphatics:  Negative.    Endocrine:  Negative.    Immunologic:  Negative.    Musculoskeletal:  Negative.    Integumentary:  Negative.    Neurologic:  Negative.       Health Status   Allergies:    Allergic Reactions (Selected)  Severity Not Documented  Lisinopril (Cough)   Medications:  (Selected)   Prescriptions  Prescribed  hydrochlorothiazide-losartan 12.5 mg-50 mg oral tablet: 1 tab(s), PO, Daily, # 90 tab(s), 1 Refill(s), Type: Maintenance, Pharmacy: [id number], 1 tab(s) Oral daily  Documented Medications  Documented  kenalog injection: Instructions: Once monthly into scalp, 0 Refill(s), Type: Maintenance,    Medications          *denotes recorded medication          hydrochlorothiazide-losartan 12.5 mg-50 mg oral tablet: 1  tab(s), PO, Daily, 90 tab(s), 1 Refill(s).          *kenalog injection: Once monthly into scalp.       Problem list:    All Problems  Alopecia areata / SNOMED CT 050800024 / Confirmed  HTN (hypertension) / SNOMED CT 4480315434 / Confirmed  Seasonal allergies / SNOMED CT 127800222 / Confirmed  Tobacco user / SNOMED CT 890003304 / Probable  Resolved: Pregnancy / SNOMED CT 624637971      Histories   Past Medical History:    Active  Seasonal allergies (740666652)  Alopecia areata (471210798)  Resolved  Pregnancy (942577331):  Resolved in 1998 at 27 years.   Family History:    Patient was adopted.   Asthma  Son (Jameson)     Procedure history:    Tonsillectomy (SNOMED CT 116543437) performed by Ramone Colon MD on 4/8/2011 at 40 Years.  laparoscopy for endometreosis on 1/20/2008 at 37 Years.  kenalog injections for hair loss.   Social History:        Alcohol Assessment            Past, 1-2 times per month      Tobacco Assessment            Smoker, current status unknown, Cigarettes, 5 per day.  30 year(s).      Substance Abuse Assessment            Never      Employment and Education Assessment            Employed, Work/School description: .      Home and Environment Assessment            Marital status: .  Spouse/Partner name: Jovan.  Risks in environment: Unlocked guns.      Nutrition and Health Assessment            Type of diet: Regular.      Exercise and Physical Activity Assessment            Exercise frequency: 1-2 times/week.  Exercise type: jogging.      Sexual Assessment            Sexually active: Yes.  Sexual orientation: Straight or heterosexual.  Contraceptive Use Details:  had               vasectomy.        Physical Examination   VS/Measurements   Eye:  Pupils are equal, round and reactive to light, Extraocular movements are intact.    Neck:  Supple, Non-tender.    Respiratory:  Lungs are clear to auscultation, Respirations are non-labored.    Cardiovascular:  Normal rate, Regular  rhythm, No edema.    Gastrointestinal:  Soft, Non-tender, No organomegaly.    Integumentary:  No rash.    Neurologic:  Alert, Oriented, Cranial Nerves II-XII are grossly intact.       Health Maintenance      Recommendations     Pending (in the next year)        OverDue           Cervical Cancer Screen (if sexually active) due  06/08/15  and every 3  year(s)           Influenza Vaccine due  08/31/19  and every 1  year(s)           Alcohol Misuse Screen (Female) due  12/07/19  and every 1  year(s)           Depression Screen (Female) due  12/07/19  and every 1  year(s)        Due            Breast Cancer Screen due  12/19/19  and every 1  year(s)        Due In Future            Lipid Disorders Screen (Female) not due until  01/09/21  and every 1  year(s)     Satisfied (in the past 1 year)        Satisfied            Body Mass Index Check (Female) on  01/14/20.           Body Mass Index Check (Female) on  09/24/19.           Body Mass Index Check (Female) on  09/03/19.           Body Mass Index Check (Female) on  08/19/19.           High Blood Pressure Screen (Female) on  01/14/20.           High Blood Pressure Screen (Female) on  09/24/19.           High Blood Pressure Screen (Female) on  09/03/19.           High Blood Pressure Screen (Female) on  08/19/19.           High Blood Pressure Screen (Female) on  06/26/19.           Lipid Disorders Screen (Female) on  01/09/20.           Lipid Disorders Screen (Female) on  01/09/20.           Lipid Disorders Screen (Female) on  01/09/20.           Lipid Disorders Screen (Female) on  01/09/20.          Review / Management   Results review:  Lab results   1/9/2020 8:12 AM CST Sodium Level 139 mmol/L    Potassium Level 4.4 mmol/L    Chloride Level 106 mmol/L    CO2 Level 27 mmol/L    Glucose Level 94 mg/dL    BUN 15 mg/dL    Creatinine Level 0.74 mg/dL    BUN/Creat Ratio NOT APPLICABLE    eGFR 95 mL/min/1.73m2    eGFR African American 110 mL/min/1.73m2    Calcium Level 9.2  mg/dL    Cholesterol 201 mg/dL  HI    Non-HDL Cholesterol 137  HI    HDL 64 mg/dL    Cholesterol/HDL Ratio 3.1      HI    Triglyceride 60 mg/dL   .       Impression and Plan   Diagnosis     HTN (hypertension) (ZUV53-LG I10).     Course:  Improving, Progressing as expected.    Plan:  Monitor blood pressure, fu 6 mo, BMI,Weight loss,exercise,diet discussed.    Patient Instructions:       Counseled: Patient, Regarding diagnosis, Regarding treatment, Regarding medications, Activity.

## 2022-02-16 NOTE — PROGRESS NOTES
Patient:   BUFFY JOHNSON            MRN: [id number]            FIN: [id number]               Age:   47 years     Sex:  Female     :  1970   Associated Diagnoses:   HTN (hypertension)   Author:   Jj Grey MD      Visit Information      Date of Service: 10/24/2017 04:00 pm  Performing Location: [facility]  Encounter#: [id number]      Primary Care Provider (PCP):  Jj Grey MD    NPI# [id number]      Referring Provider:  Jj Grey MD, NPI# [id number]      Chief Complaint   10/24/2017 4:08 PM CDT   Pt here for a HTN recheck.        History of Present Illness             The patient presents for follow-up evaluation of hypertension, chief complaint and symptoms as noted above confirmed with patient .  Exacerbating factors consist of none.  Relieving factors consist of medication.     Gets occasional yeast infection      Review of Systems   Constitutional:  Negative except as documented in history of present illness.    Eye:  Negative.    Ear/Nose/Mouth/Throat:  Negative.    Respiratory:  Negative except as documented in history of present illness.    Cardiovascular:  Negative.    Gastrointestinal:  Negative.    Genitourinary:  Negative.    Hematology/Lymphatics:  Negative except as documented in history of present illness.    Endocrine:  Negative.    Immunologic:  Negative.    Musculoskeletal:  Negative.    Integumentary:  Negative except as documented in history of present illness.    Neurologic:  Negative.       Health Status   Allergies:    Allergic Reactions (Selected)  Severity Not Documented  Lisinopril (Cough)   Medications:  (Selected)   Outpatient Medications  Ordered  Adacel (Tdap): 0.5 mL, im, once  Prescriptions  Prescribed  hydrochlorothiazide-losartan 12.5 mg-50 mg oral tablet: 1 tab(s), PO, Daily, # 90 tab(s), 0 Refill(s), Type: Maintenance, Pharmacy: [facility] #[id number], In Place of plain losartan, 1 tab(s) po daily  Documented  Medications  Documented  Vitamin D with Minerals oral tablet: 1 tab(s), po, daily, 0 Refill(s), Type: Maintenance  kenalog injection: Instructions: Once monthly into scalp, 0 Refill(s), Type: Maintenance   Problem list:    All Problems  Alopecia areata / SNOMED CT 763793786 / Confirmed  HTN (hypertension) / SNOMED CT 0160423645 / Confirmed  Seasonal Allergies / ICD-9-.9 / Confirmed  Resolved: Pregnancy / SNOMED CT 766085160      Histories   Past Medical History:    Active  Seasonal Allergies (477.9)  Alopecia areata (219005526)  Resolved  Pregnancy (261698732):  Resolved in 1998 at 27 years.   Family History:    Patient was adopted.   Asthma  Son (Jameson)     Procedure history:    Tonsillectomy (SNOMED CT 129297440) performed by Ramone Colon MD on 4/8/2011 at 40 Years.  laparoscopy for endometreosis on 1/20/2008 at 37 Years.  kenalog injections for hair loss.   Social History:        Alcohol Assessment: Current            1-2 times per month      Tobacco Assessment: Denies Tobacco Use            Past                     Comments:                      06/08/2012 - Michelle SOARES, Jillian                     Quit 05/2012      Substance Abuse Assessment: Denies Substance Abuse      Employment and Education Assessment            Employed, Work/School description: .      Exercise and Physical Activity Assessment: Regular exercise            Exercise frequency: 1-2 times/week.  Exercise type: jogging.        Physical Examination   Vital Signs   10/24/2017 4:08 PM CDT Temperature Tympanic 99 DegF    Peripheral Pulse Rate 72 bpm    Pulse Site Radial artery    Respiratory Rate 16 br/min    Systolic Blood Pressure 136 mmHg    Diastolic Blood Pressure 84 mmHg    Mean Arterial Pressure 101 mmHg    BP Site Right arm      Measurements from flowsheet : Measurements   10/24/2017 4:08 PM CDT Height Measured - Standard 61 in    Weight Measured - Standard 121 lb    BSA 1.54 m2    Body Mass Index 22.86 kg/m2      Eye:   Pupils are equal, round and reactive to light, Extraocular movements are intact.    Neck:  Supple, Non-tender.    Respiratory:  Lungs are clear to auscultation, Respirations are non-labored.    Cardiovascular:  Normal rate, Regular rhythm, No edema.    Gastrointestinal:  Soft, Non-tender, No organomegaly.    Integumentary:  No rash.    Neurologic:  Alert, Oriented, Cranial Nerves II-XII are grossly intact.       Health Maintenance      Recommendations     Pending (in the next year)        OverDue           Cervical Cancer Screen (if sexually active) due  06/08/15  and every 3  year(s)           Tetanus Vaccine due  08/31/15  and every 10  year(s)           Lipid Disorders Screen (Female) due  10/02/16  and every 1  year(s)           Alcohol Misuse Screen (Female) due  10/02/16  and every 1  year(s)           Depression Screen (Female) due  10/02/16  and every 1  year(s)        Due            Type 2 Diabetes Mellitus Screen (Female) due  10/24/17  Variable frequency        Near Due            Breast Cancer Screen near due  12/14/17  and every 1  year(s)     Satisfied (in the past 1 year)        Satisfied            Body Mass Index Check (Female) on  10/24/17.           Body Mass Index Check (Female) on  03/24/17.           Body Mass Index Check (Female) on  12/12/16.           Breast Cancer Screen on  12/14/16.           Breast Cancer Screen on  12/14/16.           High Blood Pressure Screen (Female) on  10/24/17.           High Blood Pressure Screen (Female) on  03/24/17.           High Blood Pressure Screen (Female) on  12/12/16.           Tobacco Use Screen (Female) on  10/24/17.           Tobacco Use Screen (Female) on  03/24/17.        Impression and Plan   Diagnosis     HTN (hypertension) (FDU99-WE I10).     Course:  Improving, Progressing as expected.    Plan:  Monitor blood pressure, fu 12 mo.    Patient Instructions:       Counseled: Patient, Regarding diagnosis, Regarding treatment, Regarding medications,  Activity.

## 2022-02-16 NOTE — TELEPHONE ENCOUNTER
---------------------  From: Gloria Robles LPN   Sent: 3/9/2021 1:08:29 PM CST  Subject: General Message     Notified patient via phone that cologuard results were negative.

## 2022-02-16 NOTE — PROGRESS NOTES
Patient:   BUFFY JOHNSON            MRN: [id number]            FIN: [id number]               Age:   48 years     Sex:  Female     :  1970   Associated Diagnoses:   HTN (hypertension)   Author:   Jj Grey MD      Visit Information      Date of Service: 2019 02:56 pm  Performing Location: [facility]  Encounter#: [id number]      Primary Care Provider (PCP):  Jj Grey MD    NPI# [id number]      Referring Provider:  Jj Grey MD# [id number]      Chief Complaint   2019 2:58 PM CDT    Patient is here for HTN check.        History of Present Illness             The patient presents for follow-up evaluation of hypertension, chief complaint and symptoms as noted above confirmed with patient .  The context of the hypertension: blood pressure was maintained within the target range.  Exacerbating factors consist of none.  Relieving factors consist of medication.  Associated symptoms consist of none.  Additional pertinent history: none.     Gets occasional yeast infection      Review of Systems   Constitutional:  Negative.    Eye:  Negative.    Ear/Nose/Mouth/Throat:  Negative.    Respiratory:  Negative.    Cardiovascular:  Negative.    Gastrointestinal:  Negative.    Genitourinary:  Negative.    Hematology/Lymphatics:  Negative.    Endocrine:  Negative.    Immunologic:  Negative.    Musculoskeletal:  Negative.    Integumentary:  Negative.    Neurologic:  Negative.       Health Status   Allergies:    Allergic Reactions (Selected)  Severity Not Documented  Lisinopril (Cough)   Medications:  (Selected)   Prescriptions  Prescribed  Diflucan 150 mg oral tablet: = 1 tab(s) ( 150 mg ), PO, Once, # 3 tab(s), 5 Refill(s), Type: Soft Stop, Pharmacy: [facility] #[id number], 1 tab(s) Oral once  hydrochlorothiazide-losartan 12.5 mg-50 mg oral tablet: 1 tab(s), PO, Daily, # 90 tab(s), 1 Refill(s), Type: Maintenance, Pharmacy: [facility] #[id number], 1 tab(s) Oral  daily  Documented Medications  Documented  Vitamin D with Minerals oral tablet: 1 tab(s), po, daily, 0 Refill(s), Type: Maintenance  kenalog injection: Instructions: Once monthly into scalp, 0 Refill(s), Type: Maintenance,    Medications          *denotes recorded medication          Diflucan 150 mg oral tablet: 150 mg, 1 tab(s), PO, Once, 3 tab(s), 5 Refill(s).          hydrochlorothiazide-losartan 12.5 mg-50 mg oral tablet: 1 tab(s), PO, Daily, 90 tab(s), 1 Refill(s).          *Vitamin D with Minerals oral tablet: 1 tab(s), po, daily, 0 Refill(s).          *kenalog injection: Once monthly into scalp.     Problem list:    All Problems  Alopecia areata / SNOMED CT 813730472 / Confirmed  HTN (hypertension) / SNOMED CT 4555148224 / Confirmed  Seasonal allergies / SNOMED CT 407314521 / Confirmed  Tobacco user / SNOMED CT 315499546 / Probable  Resolved: Pregnancy / SNOMED CT 824598808      Histories   Past Medical History:    Active  Seasonal allergies (505592837)  Alopecia areata (852765474)  Resolved  Pregnancy (201531551):  Resolved in 1998 at 27 years.   Family History:    Patient was adopted.   Asthma  Son (Jameson)     Procedure history:    Tonsillectomy (SNOMED CT 225974635) performed by Ramone Colon MD on 4/8/2011 at 40 Years.  laparoscopy for endometreosis on 1/20/2008 at 37 Years.  kenalog injections for hair loss.   Social History:        Alcohol Assessment            Past, 1-2 times per month      Tobacco Assessment            Smoker, current status unknown, Cigarettes, 5 per day.  30 year(s).      Substance Abuse Assessment            Never      Employment and Education Assessment            Employed, Work/School description: .      Home and Environment Assessment            Marital status: .  Spouse/Partner name: Jovan.  Risks in environment: Unlocked guns.      Nutrition and Health Assessment            Type of diet: Regular.      Exercise and Physical Activity Assessment             Exercise frequency: 1-2 times/week.  Exercise type: jogging.      Sexual Assessment            Sexually active: Yes.  Sexual orientation: Straight or heterosexual.  Contraceptive Use Details:  had               vasectomy.      Physical Examination   Vital Signs   6/26/2019 2:58 PM CDT Temperature Tympanic 98.5 DegF    Apical Heart Rate 101 bpm  HI    HR Method Electronic    Systolic Blood Pressure 120 mmHg    Diastolic Blood Pressure 78 mmHg    Mean Arterial Pressure 92 mmHg    BP Site Right arm    BP Method Electronic      Measurements from flowsheet : Measurements   6/26/2019 2:58 PM CDT    Weight Measured - Standard                124.0 lb     Eye:  Pupils are equal, round and reactive to light, Extraocular movements are intact.    Neck:  Supple, Non-tender.    Respiratory:  Lungs are clear to auscultation, Respirations are non-labored.    Cardiovascular:  Normal rate, Regular rhythm, No edema.    Gastrointestinal:  Soft, Non-tender, No organomegaly.    Integumentary:  No rash.    Neurologic:  Alert, Oriented, Cranial Nerves II-XII are grossly intact.       Health Maintenance      Recommendations     Pending (in the next year)        OverDue           Cervical Cancer Screen (if sexually active) due  06/08/15  and every 3  year(s)           Body Mass Index Check (Female) due  11/22/18  and every 1  year(s)        Due            Type 2 Diabetes Mellitus Screen (Female) due  06/26/19  Variable frequency        Due In Future            Influenza Vaccine not due until  09/01/19  and every 1  year(s)           Alcohol Misuse Screen (Female) not due until  12/07/19  and every 1  year(s)           Depression Screen (Female) not due until  12/07/19  and every 1  year(s)           Lipid Disorders Screen (Female) not due until  12/07/19  and every 1  year(s)           Breast Cancer Screen not due until  12/19/19  and every 1  year(s)     Satisfied (in the past 1 year)        Satisfied            Alcohol Misuse Screen  (Female) on  12/07/18.           Breast Cancer Screen on  12/19/18.           Breast Cancer Screen on  12/19/18.           Depression Screen (Female) on  12/07/18.           Depression Screen (Female) on  12/07/18.           Depression Screen (Female) on  12/07/18.           High Blood Pressure Screen (Female) on  06/26/19.           High Blood Pressure Screen (Female) on  12/07/18.           High Blood Pressure Screen (Female) on  09/12/18.           Lipid Disorders Screen (Female) on  12/07/18.           Lipid Disorders Screen (Female) on  12/07/18.           Lipid Disorders Screen (Female) on  12/07/18.           Lipid Disorders Screen (Female) on  12/07/18.        Impression and Plan   Diagnosis     HTN (hypertension) (CKI39-LZ I10).     Course:  Improving, Progressing as expected.    Plan:  Monitor blood pressure, fu 6 mo, BMI,Weight loss,exercise,diet discussed.    Patient Instructions:       Counseled: Patient, Regarding diagnosis, Regarding treatment, Regarding medications, Activity.

## 2022-02-16 NOTE — PROGRESS NOTES
Patient:   BUFFY JOHNSON            MRN: [id number]            FIN: [id number]               Age:   47 years     Sex:  Female     :  1970   Associated Diagnoses:   Well adult exam   Author:   Jj Grey MD      Visit Information      Date of Service: 2017 09:00 am  Performing Location: [facility]  Encounter#: [id number]      Primary Care Provider (PCP):  Jj Grey MD    NPI# [id number]   Visit type:  Annual exam.    Accompanied by:  No one.    Source of history:  Self.    Referral source   History limitation:  None.       Chief Complaint   2017 9:11 AM CST   Px        Well Adult History   Well Adult History   The general health status is good.  The effect on daily activities is no change in activity level, no change in eating habits, no change in sexual activity, no change in sleeping patterns and no change in work/school duties.        Review of Systems   Constitutional:  No weakness, No fatigue, No decreased activity, No weight gain.    Eye:  No recent visual problem, No blurring, No double vision.    Ear/Nose/Mouth/Throat:  Negative.    Respiratory:  No shortness of breath, No cough.    Cardiovascular:  No chest pain, No peripheral edema.    Gastrointestinal:  No nausea, No vomiting, No diarrhea, No constipation, No abdominal pain.    Genitourinary:  No dysuria, No hematuria, No change in urine stream.    Hematology/Lymphatics:  Negative.    Endocrine:  Negative.    Immunologic:  Negative.    Musculoskeletal:  Negative.    Integumentary:  No rash.    Neurologic:  Alert and oriented X4.    Psychiatric:  Negative.             Health Status   Allergies:    Allergic Reactions (Selected)  Severity Not Documented  Lisinopril (Cough)   Medications:  (Selected)   Prescriptions  Prescribed  Diflucan 150 mg oral tablet: 1 tab(s) ( 150 mg ), PO, Once, # 3 tab(s), 6 Refill(s), Type: Soft Stop, Pharmacy: [facility] #[id number], 1 tab(s) po once  hydrochlorothiazide-losartan  12.5 mg-50 mg oral tablet: 1 tab(s), PO, Daily, # 90 tab(s), 3 Refill(s), Type: Maintenance, Pharmacy: [facility] #[id number], 1 tab(s) po daily  Documented Medications  Documented  Vitamin D with Minerals oral tablet: 1 tab(s), po, daily, 0 Refill(s), Type: Maintenance  kenalog injection: Instructions: Once monthly into scalp, 0 Refill(s), Type: Maintenance   Problem list:    All Problems  Alopecia areata / SNOMED CT 524340803 / Confirmed  HTN (hypertension) / SNOMED CT 8389296374 / Confirmed  Seasonal Allergies / ICD-9-.9 / Confirmed  Resolved: Pregnancy / SNOMED CT 700069711      Histories   Past Medical History:    Active  Seasonal Allergies (477.9)  Alopecia areata (238944930)  Resolved  Pregnancy (570624502):  Resolved in 1998 at 27 years.   Family History:    Patient was adopted.   Asthma  Son (Jameson)     Procedure history:    Tonsillectomy (SNOMED CT 603429382) performed by Ramone Colon MD on 4/8/2011 at 40 Years.  laparoscopy for endometreosis on 1/20/2008 at 37 Years.  kenalog injections for hair loss.   Social History:        Alcohol Assessment: Current            1-2 times per month      Tobacco Assessment: Denies Tobacco Use            Past                     Comments:                      06/08/2012 - Jillian Martinez RN 05/2012      Substance Abuse Assessment: Denies Substance Abuse      Employment and Education Assessment            Employed, Work/School description: .      Exercise and Physical Activity Assessment: Regular exercise            Exercise frequency: 1-2 times/week.  Exercise type: jogging.  ,        Alcohol Assessment: Current            1-2 times per month      Tobacco Assessment: Denies Tobacco Use            Past                     Comments:                      06/08/2012 - Jillian Martinez RN 05/2012      Substance Abuse Assessment: Denies Substance Abuse      Employment and Education Assessment             Employed, Work/School description: .      Exercise and Physical Activity Assessment: Regular exercise            Exercise frequency: 1-2 times/week.  Exercise type: jogging.        Physical Examination   Vital Signs   11/22/2017 9:11 AM CST Temperature Tympanic 99.0 DegF    Peripheral Pulse Rate 99 bpm    HR Method Electronic    Systolic Blood Pressure 127 mmHg    Diastolic Blood Pressure 85 mmHg    Mean Arterial Pressure 99 mmHg    BP Method Electronic      Measurements from flowsheet : Measurements   11/22/2017 9:11 AM CST Height Measured - Standard 61 in    Weight Measured - Standard 120.4 lb    BSA 1.53 m2    Body Mass Index 22.75 kg/m2      General:  Alert and oriented, No acute distress.    Genitourinary:  Uterus absent        Labia: Bilateral, Within normal limits.         Vagina: Within normal limits.         Cervix: Within normal limits.    Musculoskeletal:  No swelling, No deformity.    Integumentary:  Warm, Dry, Pink.    Neurologic:  Alert, Oriented.    Cognition and Speech:  Oriented.    Psychiatric:  Cooperative.       Impression and Plan   Diagnosis     Well adult exam (PQS02-AH Z00.00).     Plan:  Pap done BP controlled 1 yr fu.    Patient Instructions:       Counseled: Patient, Regarding diagnosis, Regarding medications.    Counseled:  Patient, Regarding diagnosis, Regarding treatment.    Patient Instructions

## 2022-02-16 NOTE — TELEPHONE ENCOUNTER
---------------------  From: Lashanda Francis LPN (Phone Messages Pool ([id number]_[facility]))   To: [facility] Message Pool ([id number]_[place] [facility] [place]);     Sent: 6/26/2019 4:35:37 PM CDT  Subject: refills     Phone Message    PCP:   LUBA      Time of Call:  4:33pm       Person Calling:  pt  Phone number:  [phone]    Note:   Pt LM stating she was told to call and leave a message with what pharmacy to send her Rx's.  Pt will now use [facility] in [place].    I sent the hydrochlorothiazide-losartan. Was [facility] filling the Diflucan as well? (last Rx 1/9/19 # 3 with 5 refills)    Last office visit and reason:  6/26/19 HypertensionRx's sent.

## 2022-04-14 ENCOUNTER — TELEPHONE (OUTPATIENT)
Dept: FAMILY MEDICINE | Facility: CLINIC | Age: 52
End: 2022-04-14
Payer: COMMERCIAL

## 2022-04-14 DIAGNOSIS — I10 PRIMARY HYPERTENSION: Primary | ICD-10-CM

## 2022-04-14 NOTE — TELEPHONE ENCOUNTER
tfs is out of clinic until Tuesday 4.19--routing to [facility]    Reason for Call:  Medication or medication refill:    Do you use a [facility]?  Name of the pharmacy and phone number for the current request:  [facility]    Name of the medication requested: losartan 50mg HCTZ    Other request: Patient has an appointment with [facility] on the 22nd of April.  She needs 7 pills to carry her to the appt where they can be refilled by [facility]    Can we leave a detailed message on this number? YES    Phone number patient can be reached at: Cell number on file:    Telephone Information:   Mobile [phone]       Best Time: any    Call taken on 4/14/2022 at 3:28 PM by KAITLYN GIBSON

## 2022-04-15 PROBLEM — J30.2 SEASONAL ALLERGIC RHINITIS: Status: ACTIVE | Noted: 2022-04-15

## 2022-04-15 PROBLEM — L63.9 ALOPECIA AREATA: Status: ACTIVE | Noted: 2022-04-15

## 2022-04-15 PROBLEM — I10 HYPERTENSION: Status: ACTIVE | Noted: 2022-04-15

## 2022-04-15 RX ORDER — LOSARTAN POTASSIUM AND HYDROCHLOROTHIAZIDE 12.5; 5 MG/1; MG/1
1 TABLET ORAL DAILY
COMMUNITY
Start: 2021-09-21 | End: 2022-04-15

## 2022-04-15 RX ORDER — LOSARTAN POTASSIUM AND HYDROCHLOROTHIAZIDE 12.5; 5 MG/1; MG/1
1 TABLET ORAL DAILY
Qty: 15 TABLET | Refills: 0 | Status: SHIPPED | OUTPATIENT
Start: 2022-04-15 | End: 2022-04-22

## 2022-04-15 RX ORDER — FLUCONAZOLE 150 MG/1
150 TABLET ORAL ONCE
COMMUNITY
End: 2022-04-22

## 2022-04-21 DIAGNOSIS — I10 PRIMARY HYPERTENSION: Primary | ICD-10-CM

## 2022-04-21 DIAGNOSIS — Z13.220 SCREENING CHOLESTEROL LEVEL: ICD-10-CM

## 2022-04-22 ENCOUNTER — OFFICE VISIT (OUTPATIENT)
Dept: FAMILY MEDICINE | Facility: CLINIC | Age: 52
End: 2022-04-22
Payer: COMMERCIAL

## 2022-04-22 VITALS — DIASTOLIC BLOOD PRESSURE: 78 MMHG | OXYGEN SATURATION: 98 % | HEART RATE: 94 BPM | SYSTOLIC BLOOD PRESSURE: 124 MMHG

## 2022-04-22 DIAGNOSIS — Z13.220 SCREENING CHOLESTEROL LEVEL: ICD-10-CM

## 2022-04-22 DIAGNOSIS — K64.4 EXTERNAL HEMORRHOIDS: Primary | ICD-10-CM

## 2022-04-22 DIAGNOSIS — B37.31 CANDIDAL VULVOVAGINITIS: ICD-10-CM

## 2022-04-22 DIAGNOSIS — I10 PRIMARY HYPERTENSION: ICD-10-CM

## 2022-04-22 LAB
ANION GAP SERPL CALCULATED.3IONS-SCNC: 4 MMOL/L (ref 3–14)
BUN SERPL-MCNC: 15 MG/DL (ref 7–30)
CALCIUM SERPL-MCNC: 9.2 MG/DL (ref 8.5–10.1)
CHLORIDE BLD-SCNC: 108 MMOL/L (ref 94–109)
CHOLEST SERPL-MCNC: 192 MG/DL
CO2 SERPL-SCNC: 28 MMOL/L (ref 20–32)
CREAT SERPL-MCNC: 0.72 MG/DL (ref 0.52–1.04)
FASTING STATUS PATIENT QL REPORTED: YES
GFR SERPL CREATININE-BSD FRML MDRD: >90 ML/MIN/1.73M2
GLUCOSE BLD-MCNC: 102 MG/DL (ref 70–99)
HDLC SERPL-MCNC: 61 MG/DL
LDLC SERPL CALC-MCNC: 108 MG/DL
NONHDLC SERPL-MCNC: 131 MG/DL
POTASSIUM BLD-SCNC: 4.4 MMOL/L (ref 3.4–5.3)
SODIUM SERPL-SCNC: 140 MMOL/L (ref 133–144)
TRIGL SERPL-MCNC: 114 MG/DL

## 2022-04-22 PROCEDURE — 36415 COLL VENOUS BLD VENIPUNCTURE: CPT | Performed by: FAMILY MEDICINE

## 2022-04-22 PROCEDURE — 99214 OFFICE O/P EST MOD 30 MIN: CPT | Performed by: FAMILY MEDICINE

## 2022-04-22 PROCEDURE — 80061 LIPID PANEL: CPT | Performed by: FAMILY MEDICINE

## 2022-04-22 PROCEDURE — 80048 BASIC METABOLIC PNL TOTAL CA: CPT | Performed by: FAMILY MEDICINE

## 2022-04-22 RX ORDER — LOSARTAN POTASSIUM AND HYDROCHLOROTHIAZIDE 12.5; 5 MG/1; MG/1
1 TABLET ORAL DAILY
Qty: 90 TABLET | Refills: 1 | Status: SHIPPED | OUTPATIENT
Start: 2022-04-22 | End: 2022-11-08

## 2022-04-22 RX ORDER — FLUCONAZOLE 150 MG/1
150 TABLET ORAL ONCE
Qty: 1 TABLET | Refills: 3 | Status: SHIPPED | OUTPATIENT
Start: 2022-04-22 | End: 2022-04-22

## 2022-04-22 NOTE — LETTER
April 22, 2022      Valencia Tubbs   [address] [address]        Dear ,    We are writing to inform you of your test results.    Your test results fall within the expected range(s) or remain unchanged from previous results.  Please continue with current treatment plan.    Resulted Orders   Basic metabolic panel   Result Value Ref Range    Sodium 140 133 - 144 mmol/L    Potassium 4.4 3.4 - 5.3 mmol/L    Chloride 108 94 - 109 mmol/L    Carbon Dioxide (CO2) 28 20 - 32 mmol/L    Anion Gap 4 3 - 14 mmol/L    Urea Nitrogen 15 7 - 30 mg/dL    Creatinine 0.72 0.52 - 1.04 mg/dL    Calcium 9.2 8.5 - 10.1 mg/dL    Glucose 102 (H) 70 - 99 mg/dL    GFR Estimate >90 >60 mL/min/1.73m2      Comment:      Effective December 21, 2021 eGFRcr in adults is calculated using the 2021 CKD-EPI creatinine equation which includes age and gender (Shu et al., NEJ, DOI: 10.1056/RZPTbz4275266)   Lipid panel reflex to direct LDL Fasting   Result Value Ref Range    Cholesterol 192 <200 mg/dL    Triglycerides 114 <150 mg/dL    Direct Measure HDL 61 >=50 mg/dL    LDL Cholesterol Calculated 108 (H) <=100 mg/dL    Non HDL Cholesterol 131 (H) <130 mg/dL    Patient Fasting > 8hrs? Yes     Narrative    Cholesterol  Desirable:  <200 mg/dL    Triglycerides  Normal:  Less than 150 mg/dL  Borderline High:  150-199 mg/dL  High:  200-499 mg/dL  Very High:  Greater than or equal to 500 mg/dL    Direct Measure HDL  Female:  Greater than or equal to 50 mg/dL   Male:  Greater than or equal to 40 mg/dL    LDL Cholesterol  Desirable:  <100mg/dL  Above Desirable:  100-129 mg/dL   Borderline High:  130-159 mg/dL   High:  160-189 mg/dL   Very High:  >= 190 mg/dL    Non HDL Cholesterol  Desirable:  130 mg/dL  Above Desirable:  130-159 mg/dL  Borderline High:  160-189 mg/dL  High:  190-219 mg/dL  Very High:  Greater than or equal to 220 mg/dL       If you have any questions or concerns, please call the clinic at the number listed above.        Sincerely,      Jj Grey MD

## 2022-04-22 NOTE — PROGRESS NOTES
Assessment & Plan     External hemorrhoids  Continues to be symptomatic we will put in surgery referral  - Adult General Surg Referral; Future  - Lidocaine-Hydrocortisone Ace 3-0.5 % CREA; Place rectally 2 times daily as needed    Primary hypertension  Under good control no changes  - Basic metabolic panel  - losartan-hydrochlorothiazide (HYZAAR) 50-12.5 MG tablet; Take 1 tablet by mouth daily    Screening cholesterol level  Due for screening  - Lipid panel reflex to direct LDL Fasting    Candidal vulvovaginitis  Symptoms have come back we will renew  - fluconazole (DIFLUCAN) 150 MG tablet; Take 1 tablet (150 mg) by mouth once for 1 dose             Tobacco Cessation:   reports that she has been smoking. She has never used smokeless tobacco.    Minimal tobacco use        Return in about 6 months (around 10/22/2022) for Follow up.    Jj Grey MD  [facility]    Bc Birmingham is a 51 year old who presents for the following health issues overall patient's been doing well still struggling with her hemorrhoids.  Does have her yeast vaginal infection back.  No other complaints or problems at this time.   is in good health as is her adult daughter who is in graduate school.    Vaginal Problem     History of Present Illness       Hypertension: She presents for follow up of hypertension.  She does not check blood pressure  regularly outside of the clinic. Outpatient blood pressures have not been over 140/90. She does not follow a low salt diet.     She eats 2-3 servings of fruits and vegetables daily.She consumes 1 sweetened beverage(s) daily.She exercises with enough effort to increase her heart rate 10 to 19 minutes per day.  She exercises with enough effort to increase her heart rate 3 or less days per week.   She is taking medications regularly.       Patient Active Problem List   Diagnosis     Alopecia areata     Hypertension     Seasonal allergic rhinitis     Current  Outpatient Medications   Medication     fluconazole (DIFLUCAN) 150 MG tablet     Lidocaine-Hydrocortisone Ace 3-0.5 % CREA     losartan-hydrochlorothiazide (HYZAAR) 50-12.5 MG tablet     triamcinolone acetonide (KENALOG-10) 10 MG/ML injection     No current facility-administered medications for this visit.           Review of Systems   Genitourinary: Positive for vaginal discharge.      Constitutional, HEENT, cardiovascular, pulmonary, gi and gu systems are negative, except as otherwise noted.      Objective    /78 (BP Location: Right arm, Patient Position: Sitting)   Pulse 94   SpO2 98%   There is no height or weight on file to calculate BMI.  Physical Exam   GENERAL: healthy, alert and no distress  NECK: no adenopathy, no asymmetry, masses, or scars and thyroid normal to palpation  RESP: lungs clear to auscultation - no rales, rhonchi or wheezes  CV: regular rate and rhythm, normal S1 S2, no S3 or S4, no murmur, click or rub, no peripheral edema and peripheral pulses strong  ABDOMEN: soft, nontender, no hepatosplenomegaly, no masses and bowel sounds normal  MS: no gross musculoskeletal defects noted, no edema

## 2022-04-26 ENCOUNTER — TELEPHONE (OUTPATIENT)
Dept: SURGERY | Facility: CLINIC | Age: 52
End: 2022-04-26
Payer: COMMERCIAL

## 2022-04-26 NOTE — TELEPHONE ENCOUNTER
LVM for scheduling gen surg referral from Dr. Grey for hemorrhoids. Patient should schedule with Babs Silverman NP, in colorectal clinic. [facility] New next available visit. No mychart.

## 2022-06-27 NOTE — TELEPHONE ENCOUNTER
Diagnosis, Referred by & from: External Hemorrhoids   Appt date: 10/3/2022   NOTES STATUS DETAILS   OFFICE NOTE from referring provider [facility]:  4/22/22, 12/23/21 - [facility] OV with Dr. Grey   OFFICE NOTE from other specialist [facility]:  7/18/22 - [facility] OV with Dr. Johnson   DISCHARGE SUMMARY from hospital N/A    DISCHARGE REPORT from the ER N/A    OPERATIVE REPORT N/A    MEDICATION LIST Internal    LABS N/A    DIAGNOSTIC PROCEDURES N/A    IMAGING (DISC & REPORT) N/A

## 2022-07-07 ENCOUNTER — ALLIED HEALTH/NURSE VISIT (OUTPATIENT)
Dept: FAMILY MEDICINE | Facility: CLINIC | Age: 52
End: 2022-07-07
Payer: COMMERCIAL

## 2022-07-07 DIAGNOSIS — Z23 NEED FOR VACCINATION: Primary | ICD-10-CM

## 2022-07-07 PROCEDURE — 90750 HZV VACC RECOMBINANT IM: CPT

## 2022-07-07 PROCEDURE — 90471 IMMUNIZATION ADMIN: CPT

## 2022-07-18 ENCOUNTER — OFFICE VISIT (OUTPATIENT)
Dept: FAMILY MEDICINE | Facility: CLINIC | Age: 52
End: 2022-07-18
Payer: COMMERCIAL

## 2022-07-18 VITALS — HEART RATE: 88 BPM | TEMPERATURE: 98.8 F | SYSTOLIC BLOOD PRESSURE: 111 MMHG | DIASTOLIC BLOOD PRESSURE: 80 MMHG

## 2022-07-18 DIAGNOSIS — N76.0 BV (BACTERIAL VAGINOSIS): Primary | ICD-10-CM

## 2022-07-18 DIAGNOSIS — B96.89 BV (BACTERIAL VAGINOSIS): Primary | ICD-10-CM

## 2022-07-18 DIAGNOSIS — N89.8 VAGINAL DISCHARGE: ICD-10-CM

## 2022-07-18 LAB
CLUE CELLS: PRESENT
TRICHOMONAS, WET PREP: ABNORMAL
WBC'S/HIGH POWER FIELD, WET PREP: ABNORMAL
YEAST, WET PREP: ABNORMAL

## 2022-07-18 PROCEDURE — 99213 OFFICE O/P EST LOW 20 MIN: CPT | Performed by: FAMILY MEDICINE

## 2022-07-18 PROCEDURE — 87210 SMEAR WET MOUNT SALINE/INK: CPT | Mod: QW | Performed by: FAMILY MEDICINE

## 2022-07-18 RX ORDER — METRONIDAZOLE 500 MG/1
500 TABLET ORAL 2 TIMES DAILY
Qty: 14 TABLET | Refills: 0 | Status: SHIPPED | OUTPATIENT
Start: 2022-07-18 | End: 2022-07-25

## 2022-07-18 NOTE — PROGRESS NOTES
Clinical Decision Making:    At the end of the encounter, I discussed results, diagnosis, medications. Discussed red flags for immediate return to clinic/ER, as well as indications for follow up if no improvement. Patient understood and agreed to plan. Patient was stable for discharge.      ICD-10-CM    1. BV (bacterial vaginosis)  N76.0 metroNIDAZOLE (FLAGYL) 500 MG tablet    B96.89    2. Vaginal discharge  N89.8 Wet prep - Clinic Collect     Treating with metronidazole 500 mg twice daily for 7 days  No alcohol with this medication  Follow-up as needed  Patient verbalizes understanding      There are no Patient Instructions on file for this visit.   No follow-ups on file.      chief complaint    HPI:  Valencia Tubbs is a 51 year old female who presents today complaining of white discharge which is ongoing.  She has a history of frequent yeast infections and treats with Diflucan as needed.  About a week ago she was having some thick white discharge and vaginal itching.  She took 1 dose of Diflucan but then the symptoms actually seemed worse the next day when they typically get better.  She took a second tablet of Diflucan and the discharge is ongoing.  It seems to be almost a greenish color now and she continues to have vaginal itching.  She has no concern about sexually transmitted infections.  She did recently use a new vaginal lubricant  No rashes noted  Some dysuria but no urinary urgency, frequency, hematuria.  She thinks it just hurts as it comes out because of the itching.    History obtained from the patient.    Problem List:  2022-04: Alopecia areata  2022-04: Hypertension  2022-04: Seasonal allergic rhinitis      History reviewed. No pertinent past medical history.    Social History     Tobacco Use     Smoking status: Current Every Day Smoker     Smokeless tobacco: Never Used   Substance Use Topics     Alcohol use: Not on file       Review of systems  negative except listed in HPI    Vitals:    07/18/22  1550   BP: 111/80   BP Location: Right arm   Patient Position: Chair   Cuff Size: Adult Regular   Pulse: 88   Temp: 98.8  F (37.1  C)       Physical Exam  Vitals noted and within normal limits  In general she is alert, oriented, and in no acute distress  Wet prep positive for clue cells  Results for orders placed or performed in visit on 07/18/22   Wet prep - Clinic Collect     Status: Abnormal    Specimen: Vagina; Swab   Result Value Ref Range    Trichomonas Absent Absent    Yeast Absent Absent    Clue Cells Present (A) Absent    WBCs/high power field 2+ (A) None               Answers for HPI/ROS submitted by the patient on 7/18/2022  How many servings of fruits and vegetables do you eat daily?: 0-1  On average, how many sweetened beverages do you drink each day (Examples: soda, juice, sweet tea, etc.  Do NOT count diet or artificially sweetened beverages)?: 1  How many minutes a day do you exercise enough to make your heart beat faster?: 10 to 19  How many days a week do you exercise enough to make your heart beat faster?: 3 or less  How many days per week do you miss taking your medication?: 0  What is the reason for your visit today?: Vag infection  What are your symptoms?: Itching discharge  How would you describe these symptoms?: Moderate  Are your symptoms:: Worsening  Have you had these symptoms before?: Yes  Have you tried or received treatment for these symptoms before?: Yes  Did that treatment work? : Yes  Please describe the treatment you had:: Diflucan  Is there anything that makes you feel worse?: Ignoring  Is there anything that makes you feel better?: Meds

## 2022-09-15 ENCOUNTER — VIRTUAL VISIT (OUTPATIENT)
Dept: FAMILY MEDICINE | Facility: CLINIC | Age: 52
End: 2022-09-15
Payer: COMMERCIAL

## 2022-09-15 DIAGNOSIS — U07.1 INFECTION DUE TO 2019 NOVEL CORONAVIRUS: Primary | ICD-10-CM

## 2022-09-15 PROCEDURE — 99213 OFFICE O/P EST LOW 20 MIN: CPT | Mod: CS | Performed by: FAMILY MEDICINE

## 2022-09-15 NOTE — PROGRESS NOTES
Valencia is a 51 year old who is being evaluated via a billable video visit.      How would you like to obtain your AVS? Mail a copy  If the video visit is dropped, the invitation should be resent by: Text to cell phone: [phone]  Will anyone else be joining your video visit? No        Assessment & Plan     Infection due to 2019 novel coronavirus  Patient with COVID-19 infection will treat with Paxlovid warned her of side effects discussed isolation and rebound  - nirmatrelvir and ritonavir (PAXLOVID) therapy pack; Take 3 tablets by mouth 2 times daily for 5 days             Nicotine/Tobacco Cessation:  She reports that she has been smoking. She has never used smokeless tobacco.  Nicotine/Tobacco Cessation Plan:             No follow-ups on file.    Jj Grey MD  [facility]    Subjective   Valencia is a 51 year old, presenting for the following health issues: Patient just returned from [place] and on Tuesday started having head congestion runny nose now she has a cough and fever of 101.  She has body aches and headache as well no shortness of breath.  Positive COVID test.  Cough      HPI     Returned from [place] late Monday night. Tuesday sx of sneezing/cough. Wednesday felt worse and did home test which was positive. Fever of 101.          Review of Systems   Constitutional, HEENT, cardiovascular, pulmonary, gi and gu systems are negative, except as otherwise noted.      Objective           Vitals:  No vitals were obtained today due to virtual visit.    Physical Exam   GENERAL: Healthy, alert and no distress  EYES: Eyes grossly normal to inspection.  No discharge or erythema, or obvious scleral/conjunctival abnormalities.  RESP: No audible wheeze, cough, or visible cyanosis.  No visible retractions or increased work of breathing.    SKIN: Visible skin clear. No significant rash, abnormal pigmentation or lesions.  NEURO: Cranial nerves grossly intact.  Mentation and speech  appropriate for age.  PSYCH: Mentation appears normal, affect normal/bright, judgement and insight intact, normal speech and appearance well-groomed.                Video-Visit Details    Video Start Time: 950    Type of service:  Video Visit    Video End Time: 955    Originating Location (pt. Location): Home    Distant Location (provider location):  [facility]     Platform used for Video Visit: Miriam

## 2022-09-26 ENCOUNTER — ALLIED HEALTH/NURSE VISIT (OUTPATIENT)
Dept: FAMILY MEDICINE | Facility: CLINIC | Age: 52
End: 2022-09-26
Payer: COMMERCIAL

## 2022-09-26 DIAGNOSIS — Z23 NEED FOR VACCINATION: Primary | ICD-10-CM

## 2022-09-26 PROCEDURE — 90471 IMMUNIZATION ADMIN: CPT

## 2022-09-26 PROCEDURE — 99207 PR NO CHARGE NURSE ONLY: CPT

## 2022-09-26 PROCEDURE — 90750 HZV VACC RECOMBINANT IM: CPT

## 2022-09-30 ENCOUNTER — TRANSFERRED RECORDS (OUTPATIENT)
Dept: HEALTH INFORMATION MANAGEMENT | Facility: CLINIC | Age: 52
End: 2022-09-30

## 2022-10-03 ENCOUNTER — OFFICE VISIT (OUTPATIENT)
Dept: SURGERY | Facility: CLINIC | Age: 52
End: 2022-10-03
Attending: FAMILY MEDICINE
Payer: COMMERCIAL

## 2022-10-03 ENCOUNTER — PRE VISIT (OUTPATIENT)
Dept: SURGERY | Facility: CLINIC | Age: 52
End: 2022-10-03

## 2022-10-03 VITALS
SYSTOLIC BLOOD PRESSURE: 142 MMHG | BODY MASS INDEX: 23.68 KG/M2 | HEIGHT: 61 IN | WEIGHT: 125.4 LBS | DIASTOLIC BLOOD PRESSURE: 85 MMHG

## 2022-10-03 DIAGNOSIS — L29.0 PRURITUS ANI: Primary | ICD-10-CM

## 2022-10-03 DIAGNOSIS — K64.4 ANAL SKIN TAG: ICD-10-CM

## 2022-10-03 PROCEDURE — 99203 OFFICE O/P NEW LOW 30 MIN: CPT | Performed by: NURSE PRACTITIONER

## 2022-10-03 ASSESSMENT — PAIN SCALES - GENERAL: PAINLEVEL: NO PAIN (0)

## 2022-10-03 NOTE — LETTER
"10/3/2022       RE: Valencia Tubbs   [address] [facility] [address]     Dear Colleague,    Thank you for referring your patient, Valencia Tubbs, to the [facility] COLON AND RECTAL SURGERY [facility] [place] at [facility]. Please see a copy of my visit note below.    Colon and Rectal Surgery Consult Clinic Note    Date: 10/3/2022     Referring provider:  Jj Grey MD  [facility] [place] [address]     RE: Valencia Tubbs  : 1970  CURT: 10/3/2022    Valencia Tubbs is a very pleasant 52 year old female who presents today for hemorrhoids.    HPI:  Hemorrhoids have gotten worse in the past year. Has constant irritation and itching. This is worse at night. No bleeding. Bowel movements are very regular twice a day and are soft. No constipation or diarrhea. No leakage of stool. No difficulty holding bowel movements. Does not have to manually reduce them.   One vaginal birth of a 6 lb 4 oz baby. Only a superficial tear with this.  Has never had a colonoscopy. Was adopted. Negative Cologuard two years ago.    Physical Examination: Exam was chaperoned by Landry Nieto, EMT-P   BP (!) 142/85 (BP Location: Left arm, Patient Position: Sitting, Cuff Size: Adult Regular)   Ht 5' 1\"   Wt 125 lb 6.4 oz   BMI 23.69 kg/m    General: alert, oriented, in no acute distress, sitting comfortably  HEENT: mucous membranes moist  Perianal external examination:  Perianal skin: Some minor excoriation of the perianal skin  Lesions: No evidence of an external lesion, nodularity, or induration in the perianal region.  Eversion of buttocks: There was not evidence of an anal fissure. Details: N/A.  Skin tags or external hemorrhoids: Yes: circumferential small skin tags present.  Digital rectal examination: Was performed.   Sphincter tone: Good.  Palpable lesions: No.  Other: None.    Anoscopy: Was performed.   Hemorrhoids: No significant internal " hemorrhoids.  Lesions: No    Assessment/Plan: 52 year old female with anal skin tags and pruritus ani. Discussed that this is likely due to leakage of moisture. Recommended a fiber supplement such as Metamucil, Citrucel, or Benefiber to bulk up stools. Try folding a gauze in the buttock crease and determine whether there is any fecal staining that could be contributing and to keep area dry, Calmoseptine cream or zinc cream, Avoid scratching to avoid further trauma, increase fiber and water in diet, diet modification-try avoiding spicy foods, caffeine, tomatoes, carbonated beverages, milk products, cheese, chocolate, nuts, etc., using wet wipes rather than toilet paper, try wearing only cotton underwear, and avoid perfume-containing soaps (can try Dove soap). Try these interventions for 6-8 weeks and return to clinic if symptoms persist. I would not recommend surgical intervention for her skin tags as these are quite small and we cannot promise a good cosmetic result and could lead to stenosis since they are circumferential.     Medical history:  No past medical history on file.    Surgical history:  No past surgical history on file.    Problem list:  Patient Active Problem List    Diagnosis Date Noted     Alopecia areata 04/15/2022     Priority: Medium     Hypertension 04/15/2022     Priority: Medium     Seasonal allergic rhinitis 04/15/2022     Priority: Medium       Medications:  Current Outpatient Medications   Medication Sig Dispense Refill     Lidocaine-Hydrocortisone Ace 3-0.5 % CREA Place rectally 2 times daily as needed 85 g 1     losartan-hydrochlorothiazide (HYZAAR) 50-12.5 MG tablet Take 1 tablet by mouth daily 90 tablet 1     triamcinolone acetonide (KENALOG-10) 10 MG/ML injection Once monthly into scalp         Allergies:  Allergies   Allergen Reactions     Lisinopril Cough       Family history:  No family history on file.    Social history:  Social History     Tobacco Use     Smoking status: Current  "Every Day Smoker     Smokeless tobacco: Never Used   Substance Use Topics     Alcohol use: Not on file    Marital status: .    Nursing Notes:   Landry Nieto, EMT  10/3/2022 12:41 PM  Signed  Chief Complaint   Patient presents with     New Patient     External Hemorrhoids       Vitals:    10/03/22 1236   BP: (!) 142/85   BP Location: Left arm   Patient Position: Sitting   Cuff Size: Adult Regular   Weight: 125 lb 6.4 oz   Height: 5' 1\"       Body mass index is 23.69 kg/m .     Landry Nieto, EMT- P         20 minutes spent on the date of encounter (excluding time performing procedures) performing chart review, history and exam, documentation and further activities as noted above with an additional 2 minutes for anoscopy.     COOPER Costa, NP-C  Colon and Rectal Surgery   [facility]    This note was created using speech recognition software and may contain unintended word substitutions.  "

## 2022-10-03 NOTE — PROGRESS NOTES
"Colon and Rectal Surgery Consult Clinic Note    Date: 10/3/2022     Referring provider:  Jj Grey MD  [address] [place] [address]     RE: Valencia Tubbs  : 1970  CURT: 10/3/2022    Valencia Tubbs is a very pleasant 52 year old female who presents today for hemorrhoids.    HPI:  Hemorrhoids have gotten worse in the past year. Has constant irritation and itching. This is worse at night. No bleeding. Bowel movements are very regular twice a day and are soft. No constipation or diarrhea. No leakage of stool. No difficulty holding bowel movements. Does not have to manually reduce them.   One vaginal birth of a 6 lb 4 oz baby. Only a superficial tear with this.  Has never had a colonoscopy. Was adopted. Negative Cologuard two years ago.    Physical Examination: Exam was chaperoned by Landry Nieto, EMT-P   BP (!) 142/85 (BP Location: Left arm, Patient Position: Sitting, Cuff Size: Adult Regular)   Ht 5' 1\"   Wt 125 lb 6.4 oz   BMI 23.69 kg/m    General: alert, oriented, in no acute distress, sitting comfortably  HEENT: mucous membranes moist  Perianal external examination:  Perianal skin: Some minor excoriation of the perianal skin  Lesions: No evidence of an external lesion, nodularity, or induration in the perianal region.  Eversion of buttocks: There was not evidence of an anal fissure. Details: N/A.  Skin tags or external hemorrhoids: Yes: circumferential small skin tags present.  Digital rectal examination: Was performed.   Sphincter tone: Good.  Palpable lesions: No.  Other: None.    Anoscopy: Was performed.   Hemorrhoids: No significant internal hemorrhoids.  Lesions: No    Assessment/Plan: 52 year old female with anal skin tags and pruritus ani. Discussed that this is likely due to leakage of moisture. Recommended a fiber supplement such as Metamucil, Citrucel, or Benefiber to bulk up stools. Try folding a gauze in the buttock crease and determine whether there is any fecal staining " that could be contributing and to keep area dry, Calmoseptine cream or zinc cream, Avoid scratching to avoid further trauma, increase fiber and water in diet, diet modification-try avoiding spicy foods, caffeine, tomatoes, carbonated beverages, milk products, cheese, chocolate, nuts, etc., using wet wipes rather than toilet paper, try wearing only cotton underwear, and avoid perfume-containing soaps (can try Dove soap). Try these interventions for 6-8 weeks and return to clinic if symptoms persist. I would not recommend surgical intervention for her skin tags as these are quite small and we cannot promise a good cosmetic result and could lead to stenosis since they are circumferential.     Medical history:  No past medical history on file.    Surgical history:  No past surgical history on file.    Problem list:  Patient Active Problem List    Diagnosis Date Noted     Alopecia areata 04/15/2022     Priority: Medium     Hypertension 04/15/2022     Priority: Medium     Seasonal allergic rhinitis 04/15/2022     Priority: Medium       Medications:  Current Outpatient Medications   Medication Sig Dispense Refill     Lidocaine-Hydrocortisone Ace 3-0.5 % CREA Place rectally 2 times daily as needed 85 g 1     losartan-hydrochlorothiazide (HYZAAR) 50-12.5 MG tablet Take 1 tablet by mouth daily 90 tablet 1     triamcinolone acetonide (KENALOG-10) 10 MG/ML injection Once monthly into scalp         Allergies:  Allergies   Allergen Reactions     Lisinopril Cough       Family history:  No family history on file.    Social history:  Social History     Tobacco Use     Smoking status: Current Every Day Smoker     Smokeless tobacco: Never Used   Substance Use Topics     Alcohol use: Not on file    Marital status: .    Nursing Notes:   Landry Nieto, EMT  10/3/2022 12:41 PM  Signed  Chief Complaint   Patient presents with     New Patient     External Hemorrhoids       Vitals:    10/03/22 1236   BP: (!) 142/85   BP Location:  "Left arm   Patient Position: Sitting   Cuff Size: Adult Regular   Weight: 125 lb 6.4 oz   Height: 5' 1\"       Body mass index is 23.69 kg/m .     Landry Nieto, EMT- P         20 minutes spent on the date of encounter (excluding time performing procedures) performing chart review, history and exam, documentation and further activities as noted above with an additional 2 minutes for anoscopy.     COOPER Costa, NP-C  Colon and Rectal Surgery   [facility]    This note was created using speech recognition software and may contain unintended word substitutions.    "

## 2022-10-03 NOTE — PATIENT INSTRUCTIONS
GUIDE FOR PATIENTS WITH PRURITUS ANI    Pruritus ani, literally, means  itchy anus . This condition can have numerous causes, which can be as simple as hemorrhoids or as complex as uncommon infections and skin diseases. The purpose of the history, physical exam, and lab studies we have done and ordered will be to narrow the diagnostic possibilities to assure that you are getting the right treatment. As a first step, there are a number of things that need to be started and continued for the next 3-4 weeks.     1. Diet. Multiple foods can cause or worsen inflammation and itchiness around the anus. These include: coffee, tea, soda, alcohol, citrus, tomatoes, berries, and spicy or heavily spiced foods. These should be reduced or eliminated, if possible, from your diet.     2. Stop ALL creams, ointments, and topical preparations that you apply to your anus. It is very important that you inform us of all medications your are currently taking, including antibiotics, pain killers, and over-the-counter or natural medications. Occasionally (specially when the skin is very irritated), we will recommend a barrier cream, such as Calmoseptine or Desitin. Apply this 3-4 times per day.    3. We recommend daily bathing, preferably in a shower. Do not use medicated or perfumed soaps. Dove  is ideal as it is not soap and does not irritate your skin and do not use between buttocks. Washing-off the anus completely at the end of your shower is helpful to avoid any soap or shampoo residue on your skin. Also, using a hair dryer (on the cool air setting) is recommended to completely dry the perianal skin. If you have persistent leakage or a wet anus, we recommend placing a cotton ball or dry gauze and changing it as needed during the day. Loose, cotton undergarments are also recommended. Avoid recurrent and over-vigorous wiping after bowel movements. Unscented and non-medicated  wet-wipes  or  baby wipes  are preferred.    4. Start on a  fiber supplement such as Citrucel up to 3 times a day with 8-10 glasses of water a day. This will allow for a soft and easy bowel movement and will help  soak up  the extra moisture in your colon and rectum.    After 3-4 weeks, we will re-evaluate your response and inform you of the results of your tests. The majority of patients with an  itchy anus  will have a good response to these initial steps. Persistent itchiness will require an additional cream or ointment that may have to be continued for 1-2 months. It is not rare that patients with an  itchy anus  have recurrent symptoms over their lifetime and may require intermittent treatment with creams or ointments. Do not think of this as a  failure . Recurrent symptoms tend to be mild and respond well to brief treatments. As with other chronic conditions, many patients learn how to avoid these symptoms by modifying their diet or being proactive about early treatment; and in a sense  learn how to live with occasional itchiness or irritation .    We hope this is helpful for you. Mainly, so you understand how important these simple but key elements are for the success of your treatment.       Colorectal Surgery Team

## 2022-10-03 NOTE — NURSING NOTE
"Chief Complaint   Patient presents with     New Patient     External Hemorrhoids       Vitals:    10/03/22 1236   BP: (!) 142/85   BP Location: Left arm   Patient Position: Sitting   Cuff Size: Adult Regular   Weight: 125 lb 6.4 oz   Height: 5' 1\"       Body mass index is 23.69 kg/m .     Landry Nieto, EMT- P    "

## 2022-10-17 ENCOUNTER — TELEPHONE (OUTPATIENT)
Dept: FAMILY MEDICINE | Facility: CLINIC | Age: 52
End: 2022-10-17

## 2022-10-17 ENCOUNTER — LAB (OUTPATIENT)
Dept: LAB | Facility: CLINIC | Age: 52
End: 2022-10-17
Payer: COMMERCIAL

## 2022-10-17 DIAGNOSIS — I10 PRIMARY HYPERTENSION: Primary | ICD-10-CM

## 2022-10-17 DIAGNOSIS — I10 PRIMARY HYPERTENSION: ICD-10-CM

## 2022-10-17 LAB
BASOPHILS # BLD AUTO: 0.1 10E3/UL (ref 0–0.2)
BASOPHILS NFR BLD AUTO: 0 %
EOSINOPHIL # BLD AUTO: 0.3 10E3/UL (ref 0–0.7)
EOSINOPHIL NFR BLD AUTO: 3 %
ERYTHROCYTE [DISTWIDTH] IN BLOOD BY AUTOMATED COUNT: 14.7 % (ref 10–15)
HCT VFR BLD AUTO: 40 % (ref 35–47)
HGB BLD-MCNC: 12.7 G/DL (ref 11.7–15.7)
IMM GRANULOCYTES # BLD: 0 10E3/UL
IMM GRANULOCYTES NFR BLD: 0 %
LYMPHOCYTES # BLD AUTO: 3.9 10E3/UL (ref 0.8–5.3)
LYMPHOCYTES NFR BLD AUTO: 34 %
MCH RBC QN AUTO: 29.7 PG (ref 26.5–33)
MCHC RBC AUTO-ENTMCNC: 31.8 G/DL (ref 31.5–36.5)
MCV RBC AUTO: 94 FL (ref 78–100)
MONOCYTES # BLD AUTO: 0.8 10E3/UL (ref 0–1.3)
MONOCYTES NFR BLD AUTO: 7 %
NEUTROPHILS # BLD AUTO: 6.6 10E3/UL (ref 1.6–8.3)
NEUTROPHILS NFR BLD AUTO: 57 %
PLATELET # BLD AUTO: 239 10E3/UL (ref 150–450)
RBC # BLD AUTO: 4.28 10E6/UL (ref 3.8–5.2)
WBC # BLD AUTO: 11.6 10E3/UL (ref 4–11)

## 2022-10-17 PROCEDURE — 85025 COMPLETE CBC W/AUTO DIFF WBC: CPT | Mod: QW

## 2022-10-17 PROCEDURE — 36415 COLL VENOUS BLD VENIPUNCTURE: CPT

## 2022-10-17 NOTE — TELEPHONE ENCOUNTER
Patient has been seeing dermatologist.  Dermatologist is requesting a CBC with Diff.    Can you place an order for CBC with Diff.    Patient phone: [phone]

## 2022-10-26 ENCOUNTER — TELEPHONE (OUTPATIENT)
Dept: FAMILY MEDICINE | Facility: CLINIC | Age: 52
End: 2022-10-26

## 2022-11-08 ENCOUNTER — OFFICE VISIT (OUTPATIENT)
Dept: FAMILY MEDICINE | Facility: CLINIC | Age: 52
End: 2022-11-08
Payer: COMMERCIAL

## 2022-11-08 VITALS — HEART RATE: 76 BPM | SYSTOLIC BLOOD PRESSURE: 114 MMHG | DIASTOLIC BLOOD PRESSURE: 76 MMHG

## 2022-11-08 DIAGNOSIS — I10 PRIMARY HYPERTENSION: Primary | ICD-10-CM

## 2022-11-08 DIAGNOSIS — Z23 NEED FOR VACCINATION: ICD-10-CM

## 2022-11-08 PROCEDURE — 90682 RIV4 VACC RECOMBINANT DNA IM: CPT | Performed by: FAMILY MEDICINE

## 2022-11-08 PROCEDURE — 90471 IMMUNIZATION ADMIN: CPT | Performed by: FAMILY MEDICINE

## 2022-11-08 PROCEDURE — 91313 COVID-19,PF,MODERNA BIVALENT: CPT | Performed by: FAMILY MEDICINE

## 2022-11-08 PROCEDURE — 99213 OFFICE O/P EST LOW 20 MIN: CPT | Mod: 25 | Performed by: FAMILY MEDICINE

## 2022-11-08 PROCEDURE — 0134A COVID-19,PF,MODERNA BIVALENT: CPT | Performed by: FAMILY MEDICINE

## 2022-11-08 RX ORDER — LOSARTAN POTASSIUM AND HYDROCHLOROTHIAZIDE 12.5; 5 MG/1; MG/1
1 TABLET ORAL DAILY
Qty: 90 TABLET | Refills: 1 | Status: SHIPPED | OUTPATIENT
Start: 2022-11-08 | End: 2023-05-03

## 2022-11-08 RX ORDER — BARICITINIB 2 MG/1
1 TABLET, FILM COATED ORAL DAILY
COMMUNITY
Start: 2022-11-07

## 2022-11-08 NOTE — PROGRESS NOTES
Assessment & Plan     Primary hypertension  Patient with hypertension under excellent control make no changes  - losartan-hydrochlorothiazide (HYZAAR) 50-12.5 MG tablet; Take 1 tablet by mouth daily    Need for vaccination    - COVID-19,PF,MODERNA BIVALENT 18+Yrs  - INFLUENZA QUAD, PF (RIV4) (FLUBLOK)                 No follow-ups on file.    Jj Grey MD  [facility]    Bc Birmingham is a 52 year old, presenting for the following health issues: Overall patient is doing well she is starting on a new oral medication for alopecia.  She has been getting steroid shots for several years now 4.  Her blood pressures have been under good control she does not check regularly.  Her daughter Jameson is in grad school for speech pathology.  Hypertension      History of Present Illness       Hypertension: She presents for follow up of hypertension.  She does not check blood pressure  regularly outside of the clinic. Outpatient blood pressures have not been over 140/90. She does not follow a low salt diet.               Review of Systems   Constitutional, HEENT, cardiovascular, pulmonary, gi and gu systems are negative, except as otherwise noted.      Objective    /76 (BP Location: Right arm, Patient Position: Sitting, Cuff Size: Adult Regular)   Pulse 76   There is no height or weight on file to calculate BMI.  Physical Exam   GENERAL: healthy, alert and no distress  NECK: no adenopathy, no asymmetry, masses, or scars and thyroid normal to palpation  RESP: lungs clear to auscultation - no rales, rhonchi or wheezes  CV: regular rate and rhythm, normal S1 S2, no S3 or S4, no murmur, click or rub, no peripheral edema and peripheral pulses strong  ABDOMEN: soft, nontender, no hepatosplenomegaly, no masses and bowel sounds normal  MS: no gross musculoskeletal defects noted, no edema    Lab on 10/17/2022   Component Date Value Ref Range Status     WBC Count 10/17/2022 11.6 (H)  4.0 -  11.0 10e3/uL Final     RBC Count 10/17/2022 4.28  3.80 - 5.20 10e6/uL Final     Hemoglobin 10/17/2022 12.7  11.7 - 15.7 g/dL Final     Hematocrit 10/17/2022 40.0  35.0 - 47.0 % Final     MCV 10/17/2022 94  78 - 100 fL Final     MCH 10/17/2022 29.7  26.5 - 33.0 pg Final     MCHC 10/17/2022 31.8  31.5 - 36.5 g/dL Final     RDW 10/17/2022 14.7  10.0 - 15.0 % Final     Platelet Count 10/17/2022 239  150 - 450 10e3/uL Final     % Neutrophils 10/17/2022 57  % Final     % Lymphocytes 10/17/2022 34  % Final     % Monocytes 10/17/2022 7  % Final     % Eosinophils 10/17/2022 3  % Final     % Basophils 10/17/2022 0  % Final     % Immature Granulocytes 10/17/2022 0  % Final     Absolute Neutrophils 10/17/2022 6.6  1.6 - 8.3 10e3/uL Final     Absolute Lymphocytes 10/17/2022 3.9  0.8 - 5.3 10e3/uL Final     Absolute Monocytes 10/17/2022 0.8  0.0 - 1.3 10e3/uL Final     Absolute Eosinophils 10/17/2022 0.3  0.0 - 0.7 10e3/uL Final     Absolute Basophils 10/17/2022 0.1  0.0 - 0.2 10e3/uL Final     Absolute Immature Granulocytes 10/17/2022 0.0  <=0.4 10e3/uL Final

## 2022-12-09 ENCOUNTER — NURSE TRIAGE (OUTPATIENT)
Dept: FAMILY MEDICINE | Facility: CLINIC | Age: 52
End: 2022-12-09

## 2022-12-09 DIAGNOSIS — R05.1 ACUTE COUGH: Primary | ICD-10-CM

## 2022-12-09 RX ORDER — CODEINE PHOSPHATE AND GUAIFENESIN 10; 100 MG/5ML; MG/5ML
1-2 SOLUTION ORAL EVERY 4 HOURS PRN
Qty: 240 ML | Refills: 0 | Status: SHIPPED | OUTPATIENT
Start: 2022-12-09 | End: 2023-04-25

## 2022-12-09 RX ORDER — BENZONATATE 100 MG/1
100 CAPSULE ORAL 3 TIMES DAILY PRN
Qty: 30 CAPSULE | Refills: 0 | Status: SHIPPED | OUTPATIENT
Start: 2022-12-09 | End: 2023-04-25

## 2022-12-09 NOTE — TELEPHONE ENCOUNTER
Symptoms    Describe your symptoms: SEVERE COUGH with wheezing    Any pain: Yes: Sore ribs from coughing    How long have you been having symptoms:   7 days    Have you been seen for this:  No    Appointment offered?: No-completely full    Triage offered?: Yes: per patients request she would like medication    Home remedies tried: Cough Syrup, Municex DM    Preferred Pharmacy:       [id number] [address]  [address] [address]  Phone: [phone] Fax: [phone]      Okay to leave a detailed message?: Yes at Home number on file [phone] (home)

## 2022-12-09 NOTE — TELEPHONE ENCOUNTER
Last ov 6/24/21  Pending appt due in december  Last refill 9/29/20 #90x4  Last labs 2/12/21      My chart message sent to pt of needing an appt in december S-(situation): Cough, Wheezing    B-(background): Started last Friday 12.2.22  No other sx  HX of bronchitis  Prednisone in the past has helped    A-(assessment):   Non productive cough    No runny nose  No body aches, nausea  No fever  No headaches  No mucus  No difficulty or SOB    Negative Covid at home tests 2X    Robitussin  Mucinex  Cough drops  With Some relief    8 on a pain scale of 0-10    R-(recommendations):   SEE IN OFFICE TODAY  Pt asks for PCP's recommendation    If needing to call in RX, [facility] in [place]     RN advised to call back with any changes, worsening symptoms, and questions or concerns. Pt verbalized understanding of and agreement with plan and had no further questions.     Routing to provider    Lizbeth HODGES RN  [facility]    Reason for Disposition    SEVERE coughing spells (e.g., whooping sound after coughing, vomiting after coughing)    Additional Information    Negative: Bluish (or gray) lips or face    Negative: SEVERE difficulty breathing (e.g., struggling for each breath, speaks in single words)    Negative: Rapid onset of cough and has hives    Negative: Coughing started suddenly after medicine, an allergic food or bee sting    Negative: Difficulty breathing after exposure to flames, smoke, or fumes    Negative: Sounds like a life-threatening emergency to the triager    Negative: Previous asthma attacks and this feels like asthma attack    Negative: Dry cough (non-productive; no sputum or minimal clear sputum) and within 14 days of COVID-19 Exposure    Negative: MODERATE difficulty breathing (e.g., speaks in phrases, SOB even at rest, pulse 100-120) and still present when not coughing    Negative: Chest pain present when not coughing    Negative: Passed out (i.e., fainted, collapsed and was not responding)    Negative: Patient sounds very sick or weak to the triager    Negative: MILD difficulty breathing (e.g., minimal/no SOB at rest, SOB with  walking, pulse <100) and still present when not coughing    Negative: Coughed up > 1 tablespoon (15 ml) blood (Exception: Blood-tinged sputum.)    Negative: Fever > 103 F (39.4 C)    Negative: Fever > 101 F (38.3 C) and over 60 years of age    Negative: Fever > 100.0 F (37.8 C) and has diabetes mellitus or a weak immune system (e.g., HIV positive, cancer chemotherapy, organ transplant, splenectomy, chronic steroids)    Negative: Fever > 100.0 F (37.8 C) and bedridden (e.g., nursing home patient, stroke, chronic illness, recovering from surgery)    Negative: Increasing ankle swelling    Negative: Wheezing is present    Protocols used: COUGH-A-OH

## 2022-12-28 ENCOUNTER — TRANSFERRED RECORDS (OUTPATIENT)
Dept: HEALTH INFORMATION MANAGEMENT | Facility: CLINIC | Age: 52
End: 2022-12-28

## 2023-04-25 ENCOUNTER — OFFICE VISIT (OUTPATIENT)
Dept: FAMILY MEDICINE | Facility: CLINIC | Age: 53
End: 2023-04-25
Payer: COMMERCIAL

## 2023-04-25 VITALS
SYSTOLIC BLOOD PRESSURE: 112 MMHG | OXYGEN SATURATION: 99 % | HEART RATE: 81 BPM | TEMPERATURE: 98.6 F | DIASTOLIC BLOOD PRESSURE: 78 MMHG

## 2023-04-25 DIAGNOSIS — B37.31 YEAST INFECTION OF THE VAGINA: ICD-10-CM

## 2023-04-25 DIAGNOSIS — Z13.220 SCREENING CHOLESTEROL LEVEL: ICD-10-CM

## 2023-04-25 DIAGNOSIS — I10 PRIMARY HYPERTENSION: Primary | ICD-10-CM

## 2023-04-25 LAB
ANION GAP SERPL CALCULATED.3IONS-SCNC: 11 MMOL/L (ref 7–15)
BUN SERPL-MCNC: 16.5 MG/DL (ref 6–20)
CALCIUM SERPL-MCNC: 9.2 MG/DL (ref 8.6–10)
CHLORIDE SERPL-SCNC: 106 MMOL/L (ref 98–107)
CHOLEST SERPL-MCNC: 242 MG/DL
CREAT SERPL-MCNC: 0.97 MG/DL (ref 0.51–0.95)
DEPRECATED HCO3 PLAS-SCNC: 27 MMOL/L (ref 22–29)
GFR SERPL CREATININE-BSD FRML MDRD: 70 ML/MIN/1.73M2
GLUCOSE SERPL-MCNC: 75 MG/DL (ref 70–99)
HDLC SERPL-MCNC: 60 MG/DL
LDLC SERPL CALC-MCNC: 140 MG/DL
NONHDLC SERPL-MCNC: 182 MG/DL
POTASSIUM SERPL-SCNC: 3.9 MMOL/L (ref 3.4–5.3)
SODIUM SERPL-SCNC: 144 MMOL/L (ref 136–145)
TRIGL SERPL-MCNC: 211 MG/DL

## 2023-04-25 PROCEDURE — 36415 COLL VENOUS BLD VENIPUNCTURE: CPT | Performed by: FAMILY MEDICINE

## 2023-04-25 PROCEDURE — 99213 OFFICE O/P EST LOW 20 MIN: CPT | Performed by: FAMILY MEDICINE

## 2023-04-25 PROCEDURE — 80061 LIPID PANEL: CPT | Performed by: FAMILY MEDICINE

## 2023-04-25 PROCEDURE — 80048 BASIC METABOLIC PNL TOTAL CA: CPT | Performed by: FAMILY MEDICINE

## 2023-04-25 RX ORDER — FLUCONAZOLE 150 MG/1
150 TABLET ORAL
Qty: 3 TABLET | Refills: 0 | Status: SHIPPED | OUTPATIENT
Start: 2023-04-25 | End: 2023-05-02

## 2023-04-25 NOTE — PROGRESS NOTES
Assessment & Plan     Primary hypertension  Under excellent control with losartan hydrochlorothiazide Labs today recheck in 6 months  - Basic metabolic panel    Screening cholesterol level    - Lipid panel reflex to direct LDL Fasting    Yeast infection of the vagina  She gets these occasionally she like to have Diflucan hold  - fluconazole (DIFLUCAN) 150 MG tablet; Take 1 tablet (150 mg) by mouth every 3 days for 3 doses                 Jj Grey MD  [facility] [place]    Bc Birmingham is a 52 year old, presenting for the following health issues: Overall doing well.  Sees dermatology because of her alopecia.  They have her on a medication that is causing her to get some acne.  On her trunk and scalp.  She had her last period back in September no hot flashes no vaginal bleeding or other issues.  Recheck Medication        4/25/2023     3:17 PM   Additional Questions   Roomed by SMA Zelda     History of Present Illness       Hypertension: She presents for follow up of hypertension.  She does not check blood pressure  regularly outside of the clinic. Outpatient blood pressures have not been over 140/90. She does not follow a low salt diet.     She eats 0-1 servings of fruits and vegetables daily.She consumes 1 sweetened beverage(s) daily.She exercises with enough effort to increase her heart rate 10 to 19 minutes per day.  She exercises with enough effort to increase her heart rate 4 days per week.   She is taking medications regularly.               Review of Systems   Constitutional, HEENT, cardiovascular, pulmonary, gi and gu systems are negative, except as otherwise noted.      Objective    /78 (BP Location: Right arm, Patient Position: Sitting, Cuff Size: Adult Large)   Pulse 81   Temp 98.6  F (37  C)   LMP 09/15/2022 (Approximate)   SpO2 99%   There is no height or weight on file to calculate BMI.  Physical Exam   GENERAL: healthy, alert and no distress  NECK:  no adenopathy, no asymmetry, masses, or scars and thyroid normal to palpation  RESP: lungs clear to auscultation - no rales, rhonchi or wheezes  CV: regular rate and rhythm, normal S1 S2, no S3 or S4, no murmur, click or rub, no peripheral edema and peripheral pulses strong  ABDOMEN: soft, nontender, no hepatosplenomegaly, no masses and bowel sounds normal  MS: no gross musculoskeletal defects noted, no edema  Skin reveals a resolving inflamed follicle on her lower abdomen as well as one on her scalp.  She will be seeing dermatology regarding this issue

## 2023-04-25 NOTE — LETTER
April 26, 2023      Valencia Tubbs   [address] [facility] [address]        Dear ,    We are writing to inform you of your test results.    Your test results fall within the expected range(s) or remain unchanged from previous results.  Please continue with current treatment plan.    Resulted Orders   Basic metabolic panel   Result Value Ref Range    Sodium 144 136 - 145 mmol/L    Potassium 3.9 3.4 - 5.3 mmol/L    Chloride 106 98 - 107 mmol/L    Carbon Dioxide (CO2) 27 22 - 29 mmol/L    Anion Gap 11 7 - 15 mmol/L    Urea Nitrogen 16.5 6.0 - 20.0 mg/dL    Creatinine 0.97 (H) 0.51 - 0.95 mg/dL    Calcium 9.2 8.6 - 10.0 mg/dL    Glucose 75 70 - 99 mg/dL    GFR Estimate 70 >60 mL/min/1.73m2      Comment:      eGFR calculated using 2021 CKD-EPI equation.   Lipid panel reflex to direct LDL Fasting   Result Value Ref Range    Cholesterol 242 (H) <200 mg/dL    Triglycerides 211 (H) <150 mg/dL    Direct Measure HDL 60 >=50 mg/dL    LDL Cholesterol Calculated 140 (H) <=100 mg/dL    Non HDL Cholesterol 182 (H) <130 mg/dL    Narrative    Cholesterol  Desirable:  <200 mg/dL    Triglycerides  Normal:  Less than 150 mg/dL  Borderline High:  150-199 mg/dL  High:  200-499 mg/dL  Very High:  Greater than or equal to 500 mg/dL    Direct Measure HDL  Female:  Greater than or equal to 50 mg/dL   Male:  Greater than or equal to 40 mg/dL    LDL Cholesterol  Desirable:  <100mg/dL  Above Desirable:  100-129 mg/dL   Borderline High:  130-159 mg/dL   High:  160-189 mg/dL   Very High:  >= 190 mg/dL    Non HDL Cholesterol  Desirable:  130 mg/dL  Above Desirable:  130-159 mg/dL  Borderline High:  160-189 mg/dL  High:  190-219 mg/dL  Very High:  Greater than or equal to 220 mg/dL       If you have any questions or concerns, please call the clinic at the number listed above.       Sincerely,      Jj Grey MD

## 2023-05-03 ENCOUNTER — TELEPHONE (OUTPATIENT)
Dept: FAMILY MEDICINE | Facility: CLINIC | Age: 53
End: 2023-05-03
Payer: COMMERCIAL

## 2023-05-03 DIAGNOSIS — I10 PRIMARY HYPERTENSION: ICD-10-CM

## 2023-05-03 RX ORDER — LOSARTAN POTASSIUM AND HYDROCHLOROTHIAZIDE 12.5; 5 MG/1; MG/1
1 TABLET ORAL DAILY
Qty: 90 TABLET | Refills: 3 | Status: SHIPPED | OUTPATIENT
Start: 2023-05-03 | End: 2024-05-20

## 2023-05-03 NOTE — TELEPHONE ENCOUNTER
Prescription approved per [facility] Refill Protocol.    Last Written Prescription Date: 11/8/22  Last Fill Quantity: 90,  # refills: 1   Last office visit: 4/25/2023 ; last virtual visit: 9/15/2022 with prescribing provider

## 2023-07-05 ENCOUNTER — DOCUMENTATION ONLY (OUTPATIENT)
Dept: LAB | Facility: CLINIC | Age: 53
End: 2023-07-05
Payer: COMMERCIAL

## 2023-07-05 DIAGNOSIS — Z11.1 SCREENING EXAMINATION FOR PULMONARY TUBERCULOSIS: ICD-10-CM

## 2023-07-05 DIAGNOSIS — E78.5 ELEVATED LIPIDS: Primary | ICD-10-CM

## 2023-07-05 DIAGNOSIS — I10 PRIMARY HYPERTENSION: ICD-10-CM

## 2023-07-05 NOTE — PROGRESS NOTES
Patient has upcoming lab appointment tomorrow 7/6/23. Requesting TB Gold, CBC, Lipid. Please place orders.

## 2023-07-06 ENCOUNTER — LAB (OUTPATIENT)
Dept: LAB | Facility: CLINIC | Age: 53
End: 2023-07-06
Payer: COMMERCIAL

## 2023-07-06 DIAGNOSIS — Z11.1 SCREENING EXAMINATION FOR PULMONARY TUBERCULOSIS: ICD-10-CM

## 2023-07-06 DIAGNOSIS — L65.9 ALOPECIA: Primary | ICD-10-CM

## 2023-07-06 DIAGNOSIS — E78.5 ELEVATED LIPIDS: ICD-10-CM

## 2023-07-06 DIAGNOSIS — L65.9 ALOPECIA: ICD-10-CM

## 2023-07-06 DIAGNOSIS — I10 PRIMARY HYPERTENSION: ICD-10-CM

## 2023-07-06 LAB
ALBUMIN SERPL BCG-MCNC: 4.3 G/DL (ref 3.5–5.2)
ALP SERPL-CCNC: 85 U/L (ref 35–104)
ALT SERPL W P-5'-P-CCNC: 31 U/L (ref 0–50)
ANION GAP SERPL CALCULATED.3IONS-SCNC: 9 MMOL/L (ref 7–15)
AST SERPL W P-5'-P-CCNC: 28 U/L (ref 0–45)
BASOPHILS # BLD AUTO: 0.1 10E3/UL (ref 0–0.2)
BASOPHILS NFR BLD AUTO: 1 %
BILIRUB DIRECT SERPL-MCNC: <0.2 MG/DL (ref 0–0.3)
BILIRUB SERPL-MCNC: 0.2 MG/DL
BUN SERPL-MCNC: 16.1 MG/DL (ref 6–20)
CALCIUM SERPL-MCNC: 9.5 MG/DL (ref 8.6–10)
CHLORIDE SERPL-SCNC: 105 MMOL/L (ref 98–107)
CHOLEST SERPL-MCNC: 233 MG/DL
CREAT SERPL-MCNC: 0.91 MG/DL (ref 0.51–0.95)
DEPRECATED HCO3 PLAS-SCNC: 25 MMOL/L (ref 22–29)
EOSINOPHIL # BLD AUTO: 0.2 10E3/UL (ref 0–0.7)
EOSINOPHIL NFR BLD AUTO: 2 %
ERYTHROCYTE [DISTWIDTH] IN BLOOD BY AUTOMATED COUNT: 14.4 % (ref 10–15)
GFR SERPL CREATININE-BSD FRML MDRD: 76 ML/MIN/1.73M2
GLUCOSE SERPL-MCNC: 98 MG/DL (ref 70–99)
HCT VFR BLD AUTO: 41 % (ref 35–47)
HDLC SERPL-MCNC: 58 MG/DL
HGB BLD-MCNC: 13.4 G/DL (ref 11.7–15.7)
IMM GRANULOCYTES # BLD: 0 10E3/UL
IMM GRANULOCYTES NFR BLD: 0 %
LDLC SERPL CALC-MCNC: 157 MG/DL
LYMPHOCYTES # BLD AUTO: 3.1 10E3/UL (ref 0.8–5.3)
LYMPHOCYTES NFR BLD AUTO: 44 %
MCH RBC QN AUTO: 30.5 PG (ref 26.5–33)
MCHC RBC AUTO-ENTMCNC: 32.7 G/DL (ref 31.5–36.5)
MCV RBC AUTO: 93 FL (ref 78–100)
MONOCYTES # BLD AUTO: 0.4 10E3/UL (ref 0–1.3)
MONOCYTES NFR BLD AUTO: 6 %
NEUTROPHILS # BLD AUTO: 3.4 10E3/UL (ref 1.6–8.3)
NEUTROPHILS NFR BLD AUTO: 47 %
NONHDLC SERPL-MCNC: 175 MG/DL
PLATELET # BLD AUTO: 219 10E3/UL (ref 150–450)
POTASSIUM SERPL-SCNC: 4.2 MMOL/L (ref 3.4–5.3)
PROT SERPL-MCNC: 7.1 G/DL (ref 6.4–8.3)
RBC # BLD AUTO: 4.4 10E6/UL (ref 3.8–5.2)
SODIUM SERPL-SCNC: 139 MMOL/L (ref 136–145)
TRIGL SERPL-MCNC: 92 MG/DL
WBC # BLD AUTO: 7.1 10E3/UL (ref 4–11)

## 2023-07-06 PROCEDURE — 82248 BILIRUBIN DIRECT: CPT

## 2023-07-06 PROCEDURE — 85025 COMPLETE CBC W/AUTO DIFF WBC: CPT | Mod: QW

## 2023-07-06 PROCEDURE — 80061 LIPID PANEL: CPT

## 2023-07-06 PROCEDURE — 36415 COLL VENOUS BLD VENIPUNCTURE: CPT

## 2023-07-06 PROCEDURE — 80053 COMPREHEN METABOLIC PANEL: CPT

## 2023-07-12 DIAGNOSIS — L65.9 ALOPECIA: Primary | ICD-10-CM

## 2023-10-03 ENCOUNTER — TRANSFERRED RECORDS (OUTPATIENT)
Dept: HEALTH INFORMATION MANAGEMENT | Facility: CLINIC | Age: 53
End: 2023-10-03
Payer: COMMERCIAL

## 2023-10-20 ENCOUNTER — LAB (OUTPATIENT)
Dept: LAB | Facility: CLINIC | Age: 53
End: 2023-10-20
Payer: COMMERCIAL

## 2023-10-20 DIAGNOSIS — L65.9 ALOPECIA: ICD-10-CM

## 2023-10-20 LAB
ALBUMIN SERPL BCG-MCNC: 4.6 G/DL (ref 3.5–5.2)
ALP SERPL-CCNC: 81 U/L (ref 35–104)
ALT SERPL W P-5'-P-CCNC: 38 U/L (ref 0–50)
ANION GAP SERPL CALCULATED.3IONS-SCNC: 9 MMOL/L (ref 7–15)
AST SERPL W P-5'-P-CCNC: 36 U/L (ref 0–45)
BASO+EOS+MONOS # BLD AUTO: NORMAL 10*3/UL
BASO+EOS+MONOS NFR BLD AUTO: NORMAL %
BASOPHILS # BLD AUTO: 0 10E3/UL (ref 0–0.2)
BASOPHILS NFR BLD AUTO: 1 %
BILIRUB SERPL-MCNC: 0.3 MG/DL
BUN SERPL-MCNC: 14.3 MG/DL (ref 6–20)
CALCIUM SERPL-MCNC: 9.5 MG/DL (ref 8.6–10)
CHLORIDE SERPL-SCNC: 106 MMOL/L (ref 98–107)
CHOLEST SERPL-MCNC: 207 MG/DL
CREAT SERPL-MCNC: 0.82 MG/DL (ref 0.51–0.95)
DEPRECATED HCO3 PLAS-SCNC: 24 MMOL/L (ref 22–29)
EGFRCR SERPLBLD CKD-EPI 2021: 85 ML/MIN/1.73M2
EOSINOPHIL # BLD AUTO: 0.1 10E3/UL (ref 0–0.7)
EOSINOPHIL NFR BLD AUTO: 2 %
ERYTHROCYTE [DISTWIDTH] IN BLOOD BY AUTOMATED COUNT: 14.3 % (ref 10–15)
GLUCOSE SERPL-MCNC: 99 MG/DL (ref 70–99)
HCT VFR BLD AUTO: 40.3 % (ref 35–47)
HDLC SERPL-MCNC: 65 MG/DL
HGB BLD-MCNC: 13.2 G/DL (ref 11.7–15.7)
IMM GRANULOCYTES # BLD: 0 10E3/UL
IMM GRANULOCYTES NFR BLD: 0 %
LDLC SERPL CALC-MCNC: 125 MG/DL
LYMPHOCYTES # BLD AUTO: 2.8 10E3/UL (ref 0.8–5.3)
LYMPHOCYTES NFR BLD AUTO: 47 %
MCH RBC QN AUTO: 30.2 PG (ref 26.5–33)
MCHC RBC AUTO-ENTMCNC: 32.8 G/DL (ref 31.5–36.5)
MCV RBC AUTO: 92 FL (ref 78–100)
MONOCYTES # BLD AUTO: 0.4 10E3/UL (ref 0–1.3)
MONOCYTES NFR BLD AUTO: 7 %
NEUTROPHILS # BLD AUTO: 2.6 10E3/UL (ref 1.6–8.3)
NEUTROPHILS NFR BLD AUTO: 44 %
NONHDLC SERPL-MCNC: 142 MG/DL
PLATELET # BLD AUTO: 250 10E3/UL (ref 150–450)
POTASSIUM SERPL-SCNC: 4 MMOL/L (ref 3.4–5.3)
PROT SERPL-MCNC: 7.3 G/DL (ref 6.4–8.3)
RBC # BLD AUTO: 4.37 10E6/UL (ref 3.8–5.2)
SODIUM SERPL-SCNC: 139 MMOL/L (ref 135–145)
TRIGL SERPL-MCNC: 87 MG/DL
WBC # BLD AUTO: 6 10E3/UL (ref 4–11)

## 2023-10-20 PROCEDURE — 36415 COLL VENOUS BLD VENIPUNCTURE: CPT

## 2023-10-20 PROCEDURE — 80061 LIPID PANEL: CPT

## 2023-10-20 PROCEDURE — 85025 COMPLETE CBC W/AUTO DIFF WBC: CPT | Mod: QW

## 2023-10-20 PROCEDURE — 86481 TB AG RESPONSE T-CELL SUSP: CPT

## 2023-10-20 PROCEDURE — 80053 COMPREHEN METABOLIC PANEL: CPT

## 2023-10-21 ENCOUNTER — IMMUNIZATION (OUTPATIENT)
Dept: FAMILY MEDICINE | Facility: CLINIC | Age: 53
End: 2023-10-21
Payer: COMMERCIAL

## 2023-10-21 LAB
GAMMA INTERFERON BACKGROUND BLD IA-ACNC: 0 IU/ML
M TB IFN-G BLD-IMP: NEGATIVE
M TB IFN-G CD4+ BCKGRND COR BLD-ACNC: 10 IU/ML
MITOGEN IGNF BCKGRD COR BLD-ACNC: 0.08 IU/ML
MITOGEN IGNF BCKGRD COR BLD-ACNC: 0.09 IU/ML
QUANTIFERON MITOGEN: 10 IU/ML
QUANTIFERON NIL TUBE: 0 IU/ML
QUANTIFERON TB1 TUBE: 0.08 IU/ML
QUANTIFERON TB2 TUBE: 0.09

## 2023-10-21 PROCEDURE — 90682 RIV4 VACC RECOMBINANT DNA IM: CPT

## 2023-10-21 PROCEDURE — 90471 IMMUNIZATION ADMIN: CPT

## 2024-01-03 ENCOUNTER — NURSE TRIAGE (OUTPATIENT)
Dept: FAMILY MEDICINE | Facility: CLINIC | Age: 54
End: 2024-01-03

## 2024-01-03 ENCOUNTER — OFFICE VISIT (OUTPATIENT)
Dept: FAMILY MEDICINE | Facility: CLINIC | Age: 54
End: 2024-01-03
Payer: COMMERCIAL

## 2024-01-03 VITALS
OXYGEN SATURATION: 97 % | DIASTOLIC BLOOD PRESSURE: 78 MMHG | BODY MASS INDEX: 23.6 KG/M2 | SYSTOLIC BLOOD PRESSURE: 118 MMHG | TEMPERATURE: 98.2 F | HEIGHT: 61 IN | WEIGHT: 125 LBS | HEART RATE: 85 BPM | RESPIRATION RATE: 16 BRPM

## 2024-01-03 DIAGNOSIS — K22.89 ESOPHAGEAL PAIN: Primary | ICD-10-CM

## 2024-01-03 DIAGNOSIS — Z12.4 CERVICAL CANCER SCREENING: ICD-10-CM

## 2024-01-03 PROCEDURE — 99213 OFFICE O/P EST LOW 20 MIN: CPT

## 2024-01-03 NOTE — PROGRESS NOTES
Assessment & Plan     Esophageal pain  Mid esophageal pain since swallowing crabmeat on New Year's Kimberlee.  She describes this more as a discomfort.  It does not seem to be exacerbated by eating or swallowing.  Pain does radiate to neck and she has some tenderness to palpation on neck although she feels very sure that this pain is originating from the esophagus.  She does have full range of motion of neck but experiences pain with chin tuck to chest.  She does not have any other symptoms of illness, has not had any fevers.  She is advised that any symptoms of illness or fevers that could indicate meningitis develop that she should be seen immediately.  Will move forward with evaluation of esophageal pain.  Patient instructed to start omeprazole  - CT Esophagram without Contrast; Future    Cervical cancer screening  Patient last Pap smear was normal done in 2017.  Patient advised that she should schedule appointment for Pap smear.             Nicotine/Tobacco Cessation:  She reports that she has been smoking cigarettes. She has never been exposed to tobacco smoke. She has never used smokeless tobacco.          COOPER Bernard CNP [facility]    Bc Birmingham is a 53 year old, presenting for the following health issues:  Neck Pain (Neck pain, esophagus irritation 12/31/2023 )        1/3/2024    12:46 PM   Additional Questions   Roomed by DONTA Mcclendon       History of Present Illness       Reason for visit:  Xray    She eats 2-3 servings of fruits and vegetables daily.She consumes 1 sweetened beverage(s) daily.She exercises with enough effort to increase her heart rate 10 to 19 minutes per day.  She exercises with enough effort to increase her heart rate 3 or less days per week.   She is taking medications regularly.                 Review of Systems   Constitutional, HEENT, cardiovascular, pulmonary, gi and gu systems are negative, except as otherwise noted.      Objective    BP  "118/78 (BP Location: Right arm, Patient Position: Sitting, Cuff Size: Adult Regular)   Pulse 85   Temp 98.2  F (36.8  C) (Oral)   Resp 16   Ht 1.549 m (5' 1\")   Wt 56.7 kg (125 lb)   LMP 09/15/2022 (Approximate)   SpO2 97%   BMI 23.62 kg/m    Body mass index is 23.62 kg/m .  Physical Exam  HENT:      Mouth/Throat:      Lips: Pink.      Mouth: Mucous membranes are dry. No injury, lacerations, oral lesions or angioedema.      Pharynx: Oropharynx is clear. Uvula midline. No pharyngeal swelling or posterior oropharyngeal erythema.   Neck:      Thyroid: No thyroid mass or thyromegaly.      Trachea: Trachea normal. No tracheal tenderness.   Musculoskeletal:      Cervical back: Normal range of motion and neck supple. No edema or crepitus.   Lymphadenopathy:      Cervical: No cervical adenopathy.                              "

## 2024-05-08 ENCOUNTER — ANCILLARY PROCEDURE (OUTPATIENT)
Dept: GENERAL RADIOLOGY | Facility: CLINIC | Age: 54
End: 2024-05-08
Attending: FAMILY MEDICINE
Payer: COMMERCIAL

## 2024-05-08 ENCOUNTER — OFFICE VISIT (OUTPATIENT)
Dept: FAMILY MEDICINE | Facility: CLINIC | Age: 54
End: 2024-05-08
Payer: COMMERCIAL

## 2024-05-08 ENCOUNTER — ORDERS ONLY (AUTO-RELEASED) (OUTPATIENT)
Dept: FAMILY MEDICINE | Facility: CLINIC | Age: 54
End: 2024-05-08

## 2024-05-08 VITALS
SYSTOLIC BLOOD PRESSURE: 113 MMHG | HEART RATE: 84 BPM | OXYGEN SATURATION: 97 % | HEIGHT: 61 IN | DIASTOLIC BLOOD PRESSURE: 80 MMHG | TEMPERATURE: 98.3 F | BODY MASS INDEX: 23.62 KG/M2

## 2024-05-08 DIAGNOSIS — E78.5 ELEVATED LIPIDS: ICD-10-CM

## 2024-05-08 DIAGNOSIS — F17.210 CIGARETTE SMOKER: ICD-10-CM

## 2024-05-08 DIAGNOSIS — Z12.11 COLON CANCER SCREENING: ICD-10-CM

## 2024-05-08 DIAGNOSIS — I10 PRIMARY HYPERTENSION: Primary | ICD-10-CM

## 2024-05-08 DIAGNOSIS — M75.81 ROTATOR CUFF TENDONITIS, RIGHT: ICD-10-CM

## 2024-05-08 DIAGNOSIS — L63.9 ALOPECIA AREATA: ICD-10-CM

## 2024-05-08 PROCEDURE — 99214 OFFICE O/P EST MOD 30 MIN: CPT | Performed by: FAMILY MEDICINE

## 2024-05-08 PROCEDURE — 73030 X-RAY EXAM OF SHOULDER: CPT | Mod: TC | Performed by: RADIOLOGY

## 2024-05-08 NOTE — PROGRESS NOTES
"  Assessment & Plan     Primary hypertension  Under good control with losartan hydrochlorothiazide follow-up in 6 months    Elevated lipids  Stable    Alopecia areata  Dermatology on [place]    Colon cancer screening  For Cologuard  - COLOGUARD([employer]); Future    Cigarette smoker  5 cigarettes a day    Rotator cuff tendonitis, right   lost significant motion will get x-ray and get a physical therapy may need orthopedic consult  - Physical Therapy  Referral; Future  - XR Shoulder Right G/E 3 Views; Future            Subjective   Valencia is a 53 year old, presenting for the following health issues: Overall doing well.   is in good health.  Their daughter Jameson just graduated from graduate school and speech pathology.  She continues to see dermatology for alopecia.  She notes that starting in January started having a sore right shoulder that hurts when she reaches up overhead and lays on that side.  No injuries  Recheck Medication      5/8/2024     3:16 PM   Additional Questions   Roomed by Stephy-Renea Keyes-Student     History of Present Illness       Hypertension: She presents for follow up of hypertension.  She does not check blood pressure  regularly outside of the clinic. Outpatient blood pressures have not been over 140/90. She does not follow a low salt diet.     She eats 2-3 servings of fruits and vegetables daily.She consumes 1 sweetened beverage(s) daily.She exercises with enough effort to increase her heart rate 9 or less minutes per day.  She exercises with enough effort to increase her heart rate 3 or less days per week.   She is taking medications regularly.                 Review of Systems  Constitutional, HEENT, cardiovascular, pulmonary, gi and gu systems are negative, except as otherwise noted.      Objective    /80 (BP Location: Right arm, Patient Position: Sitting, Cuff Size: Adult Regular)   Pulse 84   Temp 98.3  F (36.8  C) (Tympanic)   Ht 1.549 m (5' 1\")   LMP " 09/15/2022 (Approximate)   SpO2 97%   BMI 23.62 kg/m    Body mass index is 23.62 kg/m .  Physical Exam   GENERAL: alert and no distress  NECK: no adenopathy, no asymmetry, masses, or scars  RESP: lungs clear to auscultation - no rales, rhonchi or wheezes  CV: regular rate and rhythm, normal S1 S2, no S3 or S4, no murmur, click or rub, no peripheral edema  ABDOMEN: soft, nontender, no hepatosplenomegaly, no masses and bowel sounds normal  MS: Right shoulder reveals her to have about 90 degrees of flexion 75 degrees of abduction hard to get her past that point.  Pain with stress or internal rotators.  Distal CMS intact            Signed Electronically by: Jj Grey MD

## 2024-05-20 DIAGNOSIS — I10 PRIMARY HYPERTENSION: ICD-10-CM

## 2024-05-20 RX ORDER — LOSARTAN POTASSIUM AND HYDROCHLOROTHIAZIDE 12.5; 5 MG/1; MG/1
1 TABLET ORAL DAILY
Qty: 90 TABLET | Refills: 2 | Status: SHIPPED | OUTPATIENT
Start: 2024-05-20

## 2024-06-04 LAB — NONINV COLON CA DNA+OCC BLD SCRN STL QL: NEGATIVE

## 2025-01-28 ENCOUNTER — OFFICE VISIT (OUTPATIENT)
Dept: FAMILY MEDICINE | Facility: CLINIC | Age: 55
End: 2025-01-28
Payer: COMMERCIAL

## 2025-01-28 VITALS
DIASTOLIC BLOOD PRESSURE: 85 MMHG | RESPIRATION RATE: 16 BRPM | TEMPERATURE: 98.5 F | HEART RATE: 90 BPM | OXYGEN SATURATION: 96 % | WEIGHT: 125 LBS | SYSTOLIC BLOOD PRESSURE: 127 MMHG | HEIGHT: 61 IN | BODY MASS INDEX: 23.6 KG/M2

## 2025-01-28 DIAGNOSIS — Z00.00 ENCOUNTER FOR PREVENTATIVE ADULT HEALTH CARE EXAMINATION: Primary | ICD-10-CM

## 2025-01-28 DIAGNOSIS — I10 PRIMARY HYPERTENSION: ICD-10-CM

## 2025-01-28 LAB
ALBUMIN SERPL BCG-MCNC: 4.6 G/DL (ref 3.5–5.2)
ALP SERPL-CCNC: 87 U/L (ref 40–150)
ALT SERPL W P-5'-P-CCNC: 29 U/L (ref 0–50)
ANION GAP SERPL CALCULATED.3IONS-SCNC: 9 MMOL/L (ref 7–15)
AST SERPL W P-5'-P-CCNC: 31 U/L (ref 0–45)
BASOPHILS # BLD AUTO: 0.1 10E3/UL (ref 0–0.2)
BASOPHILS NFR BLD AUTO: 1 %
BILIRUB SERPL-MCNC: 0.4 MG/DL
BUN SERPL-MCNC: 11.7 MG/DL (ref 6–20)
CALCIUM SERPL-MCNC: 9.9 MG/DL (ref 8.8–10.4)
CHLORIDE SERPL-SCNC: 104 MMOL/L (ref 98–107)
CHOLEST SERPL-MCNC: 258 MG/DL
CREAT SERPL-MCNC: 0.84 MG/DL (ref 0.51–0.95)
EGFRCR SERPLBLD CKD-EPI 2021: 82 ML/MIN/1.73M2
EOSINOPHIL # BLD AUTO: 0 10E3/UL (ref 0–0.7)
EOSINOPHIL NFR BLD AUTO: 0 %
ERYTHROCYTE [DISTWIDTH] IN BLOOD BY AUTOMATED COUNT: 13.9 % (ref 10–15)
FASTING STATUS PATIENT QL REPORTED: ABNORMAL
FASTING STATUS PATIENT QL REPORTED: ABNORMAL
GLUCOSE SERPL-MCNC: 100 MG/DL (ref 70–99)
HCO3 SERPL-SCNC: 27 MMOL/L (ref 22–29)
HCT VFR BLD AUTO: 41.9 % (ref 35–47)
HDLC SERPL-MCNC: 69 MG/DL
HGB BLD-MCNC: 13.8 G/DL (ref 11.7–15.7)
IMM GRANULOCYTES # BLD: 0 10E3/UL
IMM GRANULOCYTES NFR BLD: 0 %
LDLC SERPL CALC-MCNC: 150 MG/DL
LYMPHOCYTES # BLD AUTO: 2.9 10E3/UL (ref 0.8–5.3)
LYMPHOCYTES NFR BLD AUTO: 28 %
MCH RBC QN AUTO: 30.4 PG (ref 26.5–33)
MCHC RBC AUTO-ENTMCNC: 32.9 G/DL (ref 31.5–36.5)
MCV RBC AUTO: 92 FL (ref 78–100)
MONOCYTES # BLD AUTO: 0.6 10E3/UL (ref 0–1.3)
MONOCYTES NFR BLD AUTO: 5 %
NEUTROPHILS # BLD AUTO: 6.8 10E3/UL (ref 1.6–8.3)
NEUTROPHILS NFR BLD AUTO: 66 %
NONHDLC SERPL-MCNC: 189 MG/DL
PLATELET # BLD AUTO: 260 10E3/UL (ref 150–450)
POTASSIUM SERPL-SCNC: 4.2 MMOL/L (ref 3.4–5.3)
PROT SERPL-MCNC: 7.8 G/DL (ref 6.4–8.3)
RBC # BLD AUTO: 4.54 10E6/UL (ref 3.8–5.2)
SODIUM SERPL-SCNC: 140 MMOL/L (ref 135–145)
TRIGL SERPL-MCNC: 193 MG/DL
WBC # BLD AUTO: 10.3 10E3/UL (ref 4–11)

## 2025-01-28 PROCEDURE — 80053 COMPREHEN METABOLIC PANEL: CPT | Performed by: FAMILY MEDICINE

## 2025-01-28 PROCEDURE — 85025 COMPLETE CBC W/AUTO DIFF WBC: CPT | Mod: QW | Performed by: FAMILY MEDICINE

## 2025-01-28 PROCEDURE — 36415 COLL VENOUS BLD VENIPUNCTURE: CPT | Performed by: FAMILY MEDICINE

## 2025-01-28 PROCEDURE — 80061 LIPID PANEL: CPT | Performed by: FAMILY MEDICINE

## 2025-01-28 PROCEDURE — 99396 PREV VISIT EST AGE 40-64: CPT | Performed by: FAMILY MEDICINE

## 2025-01-28 RX ORDER — LOSARTAN POTASSIUM AND HYDROCHLOROTHIAZIDE 12.5; 5 MG/1; MG/1
1 TABLET ORAL DAILY
Qty: 90 TABLET | Refills: 3 | Status: SHIPPED | OUTPATIENT
Start: 2025-01-28

## 2025-01-28 SDOH — HEALTH STABILITY: PHYSICAL HEALTH: ON AVERAGE, HOW MANY DAYS PER WEEK DO YOU ENGAGE IN MODERATE TO STRENUOUS EXERCISE (LIKE A BRISK WALK)?: 2 DAYS

## 2025-01-28 ASSESSMENT — SOCIAL DETERMINANTS OF HEALTH (SDOH): HOW OFTEN DO YOU GET TOGETHER WITH FRIENDS OR RELATIVES?: MORE THAN THREE TIMES A WEEK

## 2025-01-28 NOTE — PROGRESS NOTES
"Preventive Care Visit  [facility]  Rafita Arnett MD, Family Medicine  Jan 28, 2025      Assessment & Plan   Problem List Items Addressed This Visit       Hypertension    Encounter for preventative adult health care examination - Primary    Relevant Orders    Lipid panel reflex to direct LDL Non-fasting    Comprehensive metabolic panel (BMP + Alb, Alk Phos, ALT, AST, Total. Bili, TP)    CBC with platelets and differential (Completed)      The patient says that she took a personal choice of requesting to discontinue any remaining preventative measures.  She indicates that as long as this does not make her lose her insurance she would rather not be reminded every year.  She says she is not depressed but on the contrary she is very happy and that is one of the reasons that if she states \"if God is to take me, so be it\"  She did want some lab work because of her getting a medication for alopecia areata.  Initially when I ask her if she was here for medication refills, she said \"oh yes that refill\", I did refill the medication but after the rest of the visit  I am not entirely sure if she is taking this medication.  I did  the patient about some of this preventive services and the outcome that can be not just left but sometimes disability such as a stroke, emphysema, oxygen dependence, cancer etc.      The longitudinal plan of care for the diagnosis(es)/condition(s) as documented were addressed during this visit. Due to the added complexity in care, I will continue to support Valencia in the subsequent management and with ongoing continuity of care.        Nicotine/Tobacco Cessation  She reports that she has been smoking cigarettes. She has never been exposed to tobacco smoke. She has never used smokeless tobacco.  Nicotine/Tobacco Cessation Plan  Information offered: Patient not interested at this time      Counseling  Appropriate preventive services were addressed with this " patient via screening, questionnaire, or discussion as appropriate for fall prevention, nutrition, physical activity, Tobacco-use cessation, social engagement, weight loss and cognition.  Checklist reviewing preventive services available has been given to the patient.  Reviewed patient's diet, addressing concerns and/or questions.   She is at risk for lack of exercise and has been provided with information to increase physical activity for the benefit of her well-being.           Bc Birmingham is a 54 year old, presenting for the following:  Physical (Physical/Medication follow up)        1/28/2025     9:30 AM   Additional Questions   Roomed by DONTA Mcclendon  The patient is here for an annual physical exam.  She wants to get some lab work particularly because she is taking something migraines alopecia areata for which she required a CMP and a CBC.  After I went through the health maintenance list, I 10 by item was declined, initially when I asked her if she wanted to postpone for 1 year she would say yes but after the third of 4 item she told me that the truth is that she lived a full life, she is happy, she works with patients with disabilities, she is leaving to [place] tomorrow for her second honeymoon.  She says she  her best friend and being with him for 36 years.  She says her daughter is a speech pathologist at a facility in [facility] and she is very proud of her.  She says that she is not doing these because of any depression.  She does acknowledge that the fact that her mother is dealing with Alzheimer's is a motivator.  She declined quitting smoking as well.  She indicates she respects preventative medicine, believes in it as well as the CDC recommendations but this is more of a personal choice.  He has discussed with her  DNR, DNI wishes and I gave her a form for advanced directives.          Health Care Directive  Patient does not have a Health Care Directive:  Discussed advance care planning with patient; information given to patient to review.      1/28/2025   General Health   How would you rate your overall physical health? Good   Feel stress (tense, anxious, or unable to sleep) Not at all         1/28/2025   Nutrition   Three or more servings of calcium each day? Yes   Diet: Regular (no restrictions)   How many servings of fruit and vegetables per day? (!) 2-3   How many sweetened beverages each day? 0-1         1/28/2025   Exercise   Days per week of moderate/strenous exercise 2 days   (!) EXERCISE CONCERN      1/28/2025   Social Factors   Frequency of gathering with friends or relatives More than three times a week   Worry food won't last until get money to buy more No   Food not last or not have enough money for food? No   Do you have housing? (Housing is defined as stable permanent housing and does not include staying ouside in a car, in a tent, in an abandoned building, in an overnight shelter, or couch-surfing.) Yes   Are you worried about losing your housing? No   Lack of transportation? No   Unable to get utilities (heat,electricity)? No         1/28/2025   Fall Risk   Fallen 2 or more times in the past year? No   Trouble with walking or balance? No          1/28/2025   Dental   Dentist two times every year? Yes            Today's PHQ-2 Score:       1/28/2025     9:17 AM   PHQ-2 ( 1999 [employer])   Q1: Little interest or pleasure in doing things 0   Q2: Feeling down, depressed or hopeless 0   PHQ-2 Score 0    Q1: Little interest or pleasure in doing things Not at all   Q2: Feeling down, depressed or hopeless Not at all   PHQ-2 Score 0       Patient-reported           1/28/2025   Substance Use   Alcohol more than 3/day or more than 7/wk Not Applicable   Do you use any other substances recreationally? No     Social History     Tobacco Use    Smoking status: Every Day     Types: Cigarettes     Passive exposure: Never    Smokeless tobacco: Never   Vaping Use    Vaping  "status: Never Used                    1/28/2025   One time HIV Screening   Previous HIV test? No         1/28/2025   STI Screening   New sexual partner(s) since last STI/HIV test? No     History of abnormal Pap smear: No - age 30-64 HPV with reflex Pap every 5 years recommended, however patient declines Pap smears.       ASCVD Risk   The 10-year ASCVD risk score (Lionel COTTON, et al., 2019) is: 5.2%    Values used to calculate the score:      Age: 54 years      Sex: Female      Is Non- : No      Diabetic: No      Tobacco smoker: Yes      Systolic Blood Pressure: 127 mmHg      Is BP treated: Yes      HDL Cholesterol: 65 mg/dL      Total Cholesterol: 207 mg/dL           Reviewed and updated as needed this visit by Provider        Surg Hx             No past medical history on file.  Past Surgical History:   Procedure Laterality Date    ISAI([employer])  03/02/2021     BP Readings from Last 3 Encounters:   01/28/25 127/85   05/08/24 113/80   01/03/24 118/78    Wt Readings from Last 3 Encounters:   01/28/25 56.7 kg (125 lb)   01/03/24 56.7 kg (125 lb)   10/03/22 56.9 kg (125 lb 6.4 oz)                  Recent Labs   Lab Test 10/20/23  0949 07/06/23  0845 04/25/23  1547   * 157* 140*   HDL 65 58 60   TRIG 87 92 211*   ALT 38 31  --    CR 0.82 0.91 0.97*   GFRESTIMATED 85 76 70   POTASSIUM 4.0 4.2 3.9             Objective    Exam  /85   Pulse 90   Temp 98.5  F (36.9  C) (Oral)   Resp 16   Ht 1.549 m (5' 1\")   Wt 56.7 kg (125 lb)   LMP 09/15/2022 (Approximate)   SpO2 96%   BMI 23.62 kg/m     Estimated body mass index is 23.62 kg/m  as calculated from the following:    Height as of this encounter: 1.549 m (5' 1\").    Weight as of this encounter: 56.7 kg (125 lb).    Physical Exam  On exam the patient appears in no acute distress, vital signs were reviewed and within normal limits, weight is within normal limits.  HEENT pupils equally reactive to light, " conjunctiva's are clear.  Ears within normal limits.  Neck supple without thyromegaly or adenopathy.  Heart regular rate and rhythm.  Lungs clear to auscultation bilaterally.  Abdomen soft, nontender, nondistended.  She appears to be very agile with normal strength in upper and lower extremities.  Affect is normal, thought process might look a little contradictory in some aspects like wanting to do some lab work but declining any other preventive services at this time however the patient seemed to be convinced, discussed at is her reasons, did not appear to be fidgety, anxious or had any dysphoria.  Her overall mood was happy.        Signed Electronically by: Rafita Arnett MD

## 2025-01-28 NOTE — PATIENT INSTRUCTIONS
It was great to see you:    Below are a summary of my recommendations as discussed during your visit:  Will get your CBC, comprehensive metabolic panel and lipid panel and results should be available by tomorrow at the latest.        Don't hesitate to contact us if you have any questions    Dr Santos (Rafita Arnett MD)

## 2025-01-29 ENCOUNTER — TELEPHONE (OUTPATIENT)
Dept: FAMILY MEDICINE | Facility: CLINIC | Age: 55
End: 2025-01-29
Payer: COMMERCIAL

## 2025-01-29 NOTE — TELEPHONE ENCOUNTER
"----- Message from Rafita Arnett sent at 1/29/2025 11:58 AM CST -----  Please let patient know her results show elevated cholesterol. Triglycerides and bad cholesterol, I recommend reducing fatty food, , glucose was also borderline elevated, something to monitor     If interested in some dietary changes you can search in your browser : \"Cholesterol: Top foods to improve your numbers [facility]\" for some recommendations      Let me know if you have any questions    Tom (Rafita Arnett MD)   "

## 2025-01-29 NOTE — TELEPHONE ENCOUNTER
Left voicemail for patient to return call to clinic. When patient returns call, please give them below message.    Balbina Lomeli CMA ............... 4:00 PM, 01/29/25

## 2025-01-29 NOTE — RESULT ENCOUNTER NOTE
"Please let patient know her results show elevated cholesterol. Triglycerides and bad cholesterol, I recommend reducing fatty food, , glucose was also borderline elevated, something to monitor     If interested in some dietary changes you can search in your browser : \"Cholesterol: Top foods to improve your numbers [facility]\" for some recommendations      Let me know if you have any questions    Tom (Rafita Arnett MD) "

## 2025-01-31 NOTE — TELEPHONE ENCOUNTER
Left voicemail for patient to return call to clinic. When patient returns call, please give them below message.    Balbina Lomeli CMA ............... 3:46 PM, 01/31/25